# Patient Record
Sex: MALE | Race: WHITE | NOT HISPANIC OR LATINO | Employment: FULL TIME | URBAN - METROPOLITAN AREA
[De-identification: names, ages, dates, MRNs, and addresses within clinical notes are randomized per-mention and may not be internally consistent; named-entity substitution may affect disease eponyms.]

---

## 2023-07-17 ENCOUNTER — OFFICE VISIT (OUTPATIENT)
Dept: PRIMARY CARE | Facility: CLINIC | Age: 48
End: 2023-07-17
Payer: COMMERCIAL

## 2023-07-17 ENCOUNTER — APPOINTMENT (OUTPATIENT)
Dept: PRIMARY CARE | Facility: CLINIC | Age: 48
End: 2023-07-17
Payer: COMMERCIAL

## 2023-07-17 VITALS
DIASTOLIC BLOOD PRESSURE: 83 MMHG | OXYGEN SATURATION: 97 % | TEMPERATURE: 98.6 F | BODY MASS INDEX: 27.19 KG/M2 | SYSTOLIC BLOOD PRESSURE: 128 MMHG | HEIGHT: 70 IN | WEIGHT: 189.9 LBS | HEART RATE: 65 BPM

## 2023-07-17 DIAGNOSIS — R06.2 WHEEZING: Primary | ICD-10-CM

## 2023-07-17 DIAGNOSIS — M62.838 MUSCLE SPASMS OF NECK: ICD-10-CM

## 2023-07-17 DIAGNOSIS — R42 DIZZINESS: Primary | ICD-10-CM

## 2023-07-17 DIAGNOSIS — H55.09 HORIZONTAL NYSTAGMUS: ICD-10-CM

## 2023-07-17 PROBLEM — H66.90 OTITIS MEDIA, ACUTE: Status: ACTIVE | Noted: 2023-07-17

## 2023-07-17 PROBLEM — R51.9 HEADACHE: Status: ACTIVE | Noted: 2023-07-17

## 2023-07-17 PROBLEM — R07.89 CHEST PAIN, MIDSTERNAL: Status: ACTIVE | Noted: 2023-07-17

## 2023-07-17 PROBLEM — R20.2 PARESTHESIA OF UPPER EXTREMITY: Status: ACTIVE | Noted: 2023-07-17

## 2023-07-17 PROBLEM — F41.9 ANXIETY: Status: ACTIVE | Noted: 2023-07-17

## 2023-07-17 PROBLEM — S16.1XXA CERVICAL STRAIN: Status: ACTIVE | Noted: 2023-07-17

## 2023-07-17 PROBLEM — H01.005 BLEPHARITIS OF LEFT LOWER EYELID: Status: ACTIVE | Noted: 2023-07-17

## 2023-07-17 PROBLEM — R34 DECREASED URINE OUTPUT: Status: ACTIVE | Noted: 2023-07-17

## 2023-07-17 PROBLEM — M54.50 LOW BACK PAIN: Status: ACTIVE | Noted: 2023-07-17

## 2023-07-17 PROBLEM — E78.5 HLD (HYPERLIPIDEMIA): Status: ACTIVE | Noted: 2023-07-17

## 2023-07-17 PROBLEM — M54.6 PAIN IN THORACIC SPINE: Status: ACTIVE | Noted: 2023-07-17

## 2023-07-17 PROBLEM — D10.39: Status: ACTIVE | Noted: 2023-07-17

## 2023-07-17 PROBLEM — R19.7 DIARRHEA: Status: ACTIVE | Noted: 2023-07-17

## 2023-07-17 PROBLEM — Q82.8 KERATODERMA: Status: ACTIVE | Noted: 2023-07-17

## 2023-07-17 PROCEDURE — 99213 OFFICE O/P EST LOW 20 MIN: CPT | Performed by: PHYSICIAN ASSISTANT

## 2023-07-17 PROCEDURE — 4004F PT TOBACCO SCREEN RCVD TLK: CPT | Performed by: PHYSICIAN ASSISTANT

## 2023-07-17 RX ORDER — ASPIRIN 81 MG/1
81 TABLET ORAL DAILY
COMMUNITY

## 2023-07-17 RX ORDER — CYCLOBENZAPRINE HCL 10 MG
10 TABLET ORAL NIGHTLY PRN
Qty: 30 TABLET | Refills: 2 | Status: SHIPPED | OUTPATIENT
Start: 2023-07-17 | End: 2024-04-15 | Stop reason: SDUPTHER

## 2023-07-17 RX ORDER — ALBUTEROL SULFATE 90 UG/1
2 AEROSOL, METERED RESPIRATORY (INHALATION) EVERY 4 HOURS PRN
COMMUNITY
Start: 2021-12-06 | End: 2023-07-17 | Stop reason: SDUPTHER

## 2023-07-17 ASSESSMENT — PATIENT HEALTH QUESTIONNAIRE - PHQ9
SUM OF ALL RESPONSES TO PHQ9 QUESTIONS 1 AND 2: 0
2. FEELING DOWN, DEPRESSED OR HOPELESS: NOT AT ALL
1. LITTLE INTEREST OR PLEASURE IN DOING THINGS: NOT AT ALL

## 2023-07-17 NOTE — PROGRESS NOTES
Subjective   Patient ID: Tiburcio Peterson Jr. is a 48 y.o. male who presents for Dizziness.    HPI       History of Present Illness  He complains of dizziness. The dizziness has been present for 10 hour. He describes the symptoms as disequalibirum and lightheadedness. Symptoms are exacerbated by rapid head movements, looking to the right, to the left, and bending. He also complains of otalgia. He denies aural pressure and otalgia.  He has been treated with nothing with  out  improvement, no OTC's tried.     Complains of having dizzy spells when staying still with slight headache in the front   Back pain , neck tightness noted rach on the L.   Hotness on left side of head   knot on left shoulder   He feels like he has vestibular issues- was tx and been doing exercises that haven't helped.   He has muscle spasm type feeling in the left side of his neck, upper trapezius, chest and shoulder.  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 He has had no relief.   Pt is wondering if these muscle spasms are helping contribute to his dizziness?  He has been through a couple sessions of vestibular physical therapy without any resolve years ago.    MRI brain done 3/22 (he had similar sx back then)    Review of Systems  Constitutional: Patient denies any fever, chills, loss of appetite, or unexplained weight loss.  Cardiovascular: Patient denies any chest pain, shortness of breath with exertion, tachycardia, palpitations, orthopnea, or paroxysmal nocturnal dyspnea.  Respiratory: Patient denies any cough, shortness breath, or wheezing.  Gastrointestinal patient denies any nausea, vomiting, diarrhea, constipation, melena, hematochezia, or reflux symptoms  Skin: Denies any rashes or skin lesions   Neurology: Patient denies any new motor or sensory losses.  Denies any numbness, tingling, weakness, and incoordination of the extremities.  Patient also denies any tremor, seizures, or  "gait instability.  Endocrinology: Denies any polyuria, polydipsia, polyphagia, or heat/cold intolerance.    Objective   /83   Pulse 65   Temp 37 °C (98.6 °F)   Ht 1.778 m (5' 10\")   Wt 86.1 kg (189 lb 14.4 oz)   SpO2 97%   BMI 27.25 kg/m²     Physical Exam  Gen. appearance: Alert and cooperative, in no acute distress, well-developed, well-nourished male.  Bilateral EACs and TMs within normal limits  Neck: Supple and without adenopathy or rigidity.  Muscle spasm noted at the anterior sternocleidomastoid, down into the upper pectoralis muscles and at the upper anterior trapezius;  there is no JVD at 90° and no carotid bruits are noted.  There is no thyromegaly, thyroid tenderness, or palpable thyroid nodules.  Heart: Regular rate and rhythm without murmur or ectopy.  Lungs: Lungs are clear to auscultation bilaterally with good air exchange.  Skin: Good turgor, moist, warm and without rashes or lesions.  Neurology:  Alert and oriented ×3, no tremor, normal gait.  Cranial nerves II through XII are grossly intact.  Deep tendon reflexes are +2/4 and equal bilaterally for the upper and lower extremities.  Strength is +5/5 equal bilaterally upper and lower extremities.  Romberg testing is negative; positive horizontal nystagmus to the left and when returning to midline  Extremities: No clubbing, cyanosis, or edema    Assessment/Plan   Diagnoses and all orders for this visit:  Dizziness  -     Comprehensive Metabolic Panel; Future  -     Comprehensive Metabolic Panel; Future  -     Hemoglobin A1C; Future  -     Vitamin D 1,25 Dihydroxy; Future  -     Referral to Physical Therapy; Future  Patient will also maintain hydration and we will consider referral to Neuro.     Horizontal nystagmus  -     Referral to Physical Therapy; Future  Patient will start vestibular therapy again.    Muscle spasms of neck  -     cyclobenzaprine (Flexeril) 10 mg tablet; Take 1 tablet (10 mg) by mouth as needed at bedtime for muscle " spasms.  For the muscle spasms he will try heat, stretching, Flexeril and naproxen at night for approximately the next 2 weeks and return to the clinic if this is not improving.    Patient understands that should they have testing outside   facilities that we may not receive the results and was told to call us if they have not heard from our office within a week after testing.    [facility] uses voice recognition technology for dictations. Sometimes the software misinterprets words. Please take this into account when reading this.

## 2023-07-19 RX ORDER — ALBUTEROL SULFATE 90 UG/1
2 AEROSOL, METERED RESPIRATORY (INHALATION) EVERY 4 HOURS PRN
Qty: 18 G | Refills: 3 | Status: SHIPPED | OUTPATIENT
Start: 2023-07-19

## 2023-07-21 ENCOUNTER — LAB (OUTPATIENT)
Dept: LAB | Facility: LAB | Age: 48
End: 2023-07-21
Payer: COMMERCIAL

## 2023-07-21 DIAGNOSIS — R42 DIZZINESS: ICD-10-CM

## 2023-07-21 LAB
ALANINE AMINOTRANSFERASE (SGPT) (U/L) IN SER/PLAS: 20 U/L (ref 10–52)
ALBUMIN (G/DL) IN SER/PLAS: 4.2 G/DL (ref 3.4–5)
ALKALINE PHOSPHATASE (U/L) IN SER/PLAS: 66 U/L (ref 33–120)
ANION GAP IN SER/PLAS: 9 MMOL/L (ref 10–20)
ASPARTATE AMINOTRANSFERASE (SGOT) (U/L) IN SER/PLAS: 18 U/L (ref 9–39)
BILIRUBIN TOTAL (MG/DL) IN SER/PLAS: 0.6 MG/DL (ref 0–1.2)
CALCIUM (MG/DL) IN SER/PLAS: 9.5 MG/DL (ref 8.6–10.3)
CARBON DIOXIDE, TOTAL (MMOL/L) IN SER/PLAS: 29 MMOL/L (ref 21–32)
CHLORIDE (MMOL/L) IN SER/PLAS: 106 MMOL/L (ref 98–107)
CREATININE (MG/DL) IN SER/PLAS: 0.9 MG/DL (ref 0.5–1.3)
ESTIMATED AVERAGE GLUCOSE FOR HBA1C: 103 MG/DL
GFR MALE: >90 ML/MIN/1.73M2
GLUCOSE (MG/DL) IN SER/PLAS: 80 MG/DL (ref 74–99)
HEMOGLOBIN A1C/HEMOGLOBIN TOTAL IN BLOOD: 5.2 %
POTASSIUM (MMOL/L) IN SER/PLAS: 4.1 MMOL/L (ref 3.5–5.3)
PROTEIN TOTAL: 6.9 G/DL (ref 6.4–8.2)
SODIUM (MMOL/L) IN SER/PLAS: 140 MMOL/L (ref 136–145)
UREA NITROGEN (MG/DL) IN SER/PLAS: 14 MG/DL (ref 6–23)

## 2023-07-21 PROCEDURE — 83036 HEMOGLOBIN GLYCOSYLATED A1C: CPT

## 2023-07-21 PROCEDURE — 82652 VIT D 1 25-DIHYDROXY: CPT

## 2023-07-21 PROCEDURE — 80053 COMPREHEN METABOLIC PANEL: CPT

## 2023-07-21 PROCEDURE — 36415 COLL VENOUS BLD VENIPUNCTURE: CPT

## 2023-07-24 LAB — VITAMIN D 1,25-DIHYDROXY: 29 PG/ML (ref 19.9–79.3)

## 2023-07-31 ENCOUNTER — TELEPHONE (OUTPATIENT)
Dept: PRIMARY CARE | Facility: CLINIC | Age: 48
End: 2023-07-31
Payer: COMMERCIAL

## 2023-07-31 NOTE — TELEPHONE ENCOUNTER
----- Message from Daria Kulkarni PA-C sent at 7/31/2023 12:25 PM EDT -----  Please call the patient regarding his labs, they look good.

## 2024-04-15 ENCOUNTER — OFFICE VISIT (OUTPATIENT)
Dept: PRIMARY CARE | Facility: CLINIC | Age: 49
End: 2024-04-15
Payer: COMMERCIAL

## 2024-04-15 VITALS
SYSTOLIC BLOOD PRESSURE: 119 MMHG | HEIGHT: 70 IN | DIASTOLIC BLOOD PRESSURE: 84 MMHG | BODY MASS INDEX: 26.87 KG/M2 | WEIGHT: 187.7 LBS | OXYGEN SATURATION: 98 % | TEMPERATURE: 97.9 F | HEART RATE: 67 BPM

## 2024-04-15 DIAGNOSIS — M62.838 MUSCLE SPASMS OF NECK: ICD-10-CM

## 2024-04-15 DIAGNOSIS — S39.012D STRAIN OF LUMBAR REGION, SUBSEQUENT ENCOUNTER: ICD-10-CM

## 2024-04-15 DIAGNOSIS — K42.9 UMBILICAL HERNIA WITHOUT OBSTRUCTION AND WITHOUT GANGRENE: Primary | ICD-10-CM

## 2024-04-15 PROCEDURE — 99213 OFFICE O/P EST LOW 20 MIN: CPT | Performed by: PHYSICIAN ASSISTANT

## 2024-04-15 RX ORDER — CYCLOBENZAPRINE HCL 10 MG
10 TABLET ORAL NIGHTLY PRN
Qty: 30 TABLET | Refills: 2 | Status: SHIPPED | OUTPATIENT
Start: 2024-04-15 | End: 2024-12-11

## 2024-04-15 RX ORDER — NAPROXEN 500 MG/1
500 TABLET ORAL 2 TIMES DAILY PRN
Qty: 60 TABLET | Refills: 2 | Status: SHIPPED | OUTPATIENT
Start: 2024-04-15 | End: 2024-07-14

## 2024-04-15 ASSESSMENT — PATIENT HEALTH QUESTIONNAIRE - PHQ9
2. FEELING DOWN, DEPRESSED OR HOPELESS: NOT AT ALL
1. LITTLE INTEREST OR PLEASURE IN DOING THINGS: NOT AT ALL
SUM OF ALL RESPONSES TO PHQ9 QUESTIONS 1 AND 2: 0

## 2024-04-15 NOTE — PROGRESS NOTES
"Subjective   Patient ID: Tiburcio Peterson Jr. is a 49 y.o. male who presents for Hernia.    HPI         Patient states he feels like he might of \"rupture his umbilical hernia\",  says painful as well as back pain.   Pain shoots through groin, feels hardness, stopped eating heavy and it helps. Noticed it a lot on Friday.   Pain is a 4/10 groin, pushing in umbilicus makes pain shoot to groin.   No issues with urinating /BM's are a little strained and he feels like he needs to push on umbilicus to support his ability to have BM's.    Back 7/10 chronic issue with recent worsening, seen by Dr. Lennon in the past; lumbar area not recently imaged.    Pain does occ radiate down legs. L side worse than R.  NO saddle paresthesias.   Naproxen and Flexeril to help.  He has done physical therapy for his thoracic and cervical spine and improved significantly but does have bouts of acute worsening.  Patient thinks that his hernia and core weakness could be contributing to lumbar pain        Review of Systems  Constitutional: Patient denies any fever, chills, loss of appetite, or unexplained weight loss.  Cardiovascular: Patient denies any chest pain, shortness of breath with exertion, tachycardia, palpitations, orthopnea, or paroxysmal nocturnal dyspnea.  Respiratory: Patient denies any cough, shortness breath, or wheezing.  Gastrointestinal patient denies any nausea, vomiting, diarrhea, constipation, melena, hematochezia, or reflux symptoms  Skin: Denies any rashes or skin lesions   Neurology: Patient denies any new motor or sensory losses.  Denies any numbness, tingling, weakness, and incoordination of the extremities.  Patient also denies any tremor, seizures, or gait instability.  Endocrinology: Denies any polyuria, polydipsia, polyphagia, or heat/cold intolerance.    Objective   /84   Pulse 67   Temp 36.6 °C (97.9 °F)   Ht 1.778 m (5' 10\")   Wt 85.1 kg (187 lb 11.2 oz)   SpO2 98%   BMI 26.93 kg/m²     Physical " Exam  Gen. appearance: Alert and cooperative, in no acute distress, well-developed, well-nourished male.  Neck: Supple and without adenopathy or rigidity.  There is no JVD at 90° and no carotid bruits are noted.  There is no thyromegaly, thyroid tenderness, or palpable thyroid nodules.  Heart: Regular rate and rhythm without murmur or ectopy.  Lungs: Lungs are clear to auscultation bilaterally with good air exchange.  Billable hernia with mild tenderness noted at the 12:00 and 9 o'clock position  Skin: Good turgor, moist, warm and without rashes or lesions.  Neurological exam: Alert and oriented ×3, no tremor, normal gait.  Extremities: No clubbing, cyanosis, or edema  Back: SLR+ R side at 40 degrees. - Patricks b/l, LROM with flexion and extension of lumbar spine. No vertebral tenderness, No CVA tenderness.  Lumbar paraspinal muscle spasm noted bilaterally.  Worse on the right.    Assessment/Plan   Diagnoses and all orders for this visit:  Umbilical hernia without obstruction and without gangrene-nonstrangulated but tender.  Radiates to the groin.  Patient is being referred back to Dr. Ramirez who would he had previously established with for colonoscopy for full evaluation of his hernia.      Strain of lumbar region, subsequent encounter  -     XR lumbar spine 2-3 views; Future  Chronic issue with recent worsening.  No recent x-rays.  Those were ordered and we will call him with those results as soon as they are reviewed.  He will continue with Flexeril and naproxen to help with these issues.  Core strengthening was also discussed at length.  He is currently not interested in physical therapy at this time because of cost.    Muscle spasms of neck  -     cyclobenzaprine (Flexeril) 10 mg tablet; Take 1 tablet (10 mg) by mouth as needed at bedtime for muscle spasms.  -     naproxen (Naprosyn) 500 mg tablet; Take 1 tablet (500 mg) by mouth 2 times a day as needed for mild pain (1 - 3) (pain).  Patient will also continue  to take Flexeril and naproxen as needed for cervical m. Spasms.     Patient is to return to clinic if symptoms or not improved within the next 2 weeks or sooner should they worsen.  At any point in time he have saddle paresthesia, lower extremity numbness tingling or weakness he is to go to the emergency room.    Patient understands that should they have testing outside   facilities that we may not receive the results and was told to call us if they have not heard from our office within a week after testing.    [facility] uses voice recognition technology for dictations. Sometimes the software misinterprets words. Please take this into account when reading this.

## 2024-04-25 ENCOUNTER — HOSPITAL ENCOUNTER (OUTPATIENT)
Dept: RADIOLOGY | Facility: HOSPITAL | Age: 49
Discharge: HOME | End: 2024-04-25
Payer: COMMERCIAL

## 2024-04-25 DIAGNOSIS — S39.012D STRAIN OF LUMBAR REGION, SUBSEQUENT ENCOUNTER: ICD-10-CM

## 2024-04-25 PROCEDURE — 72110 X-RAY EXAM L-2 SPINE 4/>VWS: CPT | Performed by: RADIOLOGY

## 2024-04-25 PROCEDURE — 72110 X-RAY EXAM L-2 SPINE 4/>VWS: CPT

## 2024-05-01 ENCOUNTER — OFFICE VISIT (OUTPATIENT)
Dept: SURGERY | Facility: CLINIC | Age: 49
End: 2024-05-01
Payer: COMMERCIAL

## 2024-05-01 VITALS
SYSTOLIC BLOOD PRESSURE: 128 MMHG | HEIGHT: 71 IN | HEART RATE: 82 BPM | WEIGHT: 189 LBS | BODY MASS INDEX: 26.46 KG/M2 | DIASTOLIC BLOOD PRESSURE: 84 MMHG

## 2024-05-01 DIAGNOSIS — K42.9 UMBILICAL HERNIA WITHOUT OBSTRUCTION AND WITHOUT GANGRENE: Primary | ICD-10-CM

## 2024-05-01 PROCEDURE — 99213 OFFICE O/P EST LOW 20 MIN: CPT | Performed by: SURGERY

## 2024-05-01 ASSESSMENT — ENCOUNTER SYMPTOMS
NEUROLOGICAL NEGATIVE: 1
RESPIRATORY NEGATIVE: 1
ENDOCRINE NEGATIVE: 1
EYES NEGATIVE: 1
PSYCHIATRIC NEGATIVE: 1
ALLERGIC/IMMUNOLOGIC NEGATIVE: 1
CONSTITUTIONAL NEGATIVE: 1
GASTROINTESTINAL NEGATIVE: 1
CARDIOVASCULAR NEGATIVE: 1
MUSCULOSKELETAL NEGATIVE: 1
HEMATOLOGIC/LYMPHATIC NEGATIVE: 1

## 2024-05-01 NOTE — PROGRESS NOTES
Subjective   Patient ID: Tiburcio Peterson Jr. is a 49 y.o. male who presents for Hernia (Here today to talk about a hernia/).  HPI  Increasingly symptomatic umbilical hernia.  Gradually noticed more protrusion associated with discomfort to direct palpation.  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      Review of Systems   Constitutional: Negative.    HENT: Negative.     Eyes: Negative.    Respiratory: Negative.     Cardiovascular: Negative.    Gastrointestinal: Negative.    Endocrine: Negative.    Genitourinary: Negative.    Musculoskeletal: Negative.    Skin: Negative.    Allergic/Immunologic: Negative.    Neurological: Negative.    Hematological: Negative.    Psychiatric/Behavioral: Negative.           No past medical history on file.  Past Surgical History:   Procedure Laterality Date    OTHER SURGICAL HISTORY  12/18/2019    Femur fracture repair    OTHER SURGICAL HISTORY  12/18/2019    Radius fracture repair     No family history on file.   Social History     Socioeconomic History    Marital status:      Spouse name: None    Number of children: None    Years of education: None    Highest education level: None   Occupational History    None   Tobacco Use    Smoking status: Every Day     Current packs/day: 0.50     Average packs/day: 0.5 packs/day for 20.0 years (10.0 ttl pk-yrs)     Types: Cigarettes    Smokeless tobacco: Never    Tobacco comments:     Patient understands that continued smoking will increase the risks of lung disease, vascular disease, and multiple malignancies.   Substance and Sexual Activity    Alcohol use: Yes     Comment: socially    Drug use: Never    Sexual activity: None   Other Topics Concern    None   Social History Narrative    None     Social Determinants of Health     Financial Resource Strain: Not on file   Food Insecurity: Not on file   Transportation Needs: Not on file   Physical  Activity: Not on file   Stress: Not on file   Social Connections: Not on file   Intimate Partner Violence: Not on file   Housing Stability: Not on file          Current Outpatient Medications:     albuterol 90 mcg/actuation inhaler, Inhale 2 puffs every 4 hours if needed for wheezing or shortness of breath., Disp: 18 g, Rfl: 3    aspirin 81 mg EC tablet, Take 1 tablet (81 mg) by mouth once daily., Disp: , Rfl:     cyclobenzaprine (Flexeril) 10 mg tablet, Take 1 tablet (10 mg) by mouth as needed at bedtime for muscle spasms., Disp: 30 tablet, Rfl: 2    naproxen (Naprosyn) 500 mg tablet, Take 1 tablet (500 mg) by mouth 2 times a day as needed for mild pain (1 - 3) (pain)., Disp: 60 tablet, Rfl: 2     Objective   Vitals:    05/01/24 1456   BP: 128/84   Pulse: 82      Physical Exam  Constitutional:       General: He is not in acute distress.     Appearance: Normal appearance.   HENT:      Head: Normocephalic and atraumatic.      Mouth/Throat:      Pharynx: Oropharynx is clear.   Eyes:      Conjunctiva/sclera: Conjunctivae normal.      Pupils: Pupils are equal, round, and reactive to light.   Cardiovascular:      Rate and Rhythm: Normal rate and regular rhythm.   Pulmonary:      Effort: Pulmonary effort is normal.   Abdominal:      General: Abdomen is flat. There is no distension.      Palpations: Abdomen is soft.      Hernia: A hernia is present.      Comments: Umbilical hernia less than 1 cm defect, reducible, tender to palpation.   Musculoskeletal:         General: Normal range of motion.   Skin:     General: Skin is warm and dry.   Neurological:      General: No focal deficit present.      Mental Status: He is alert. Mental status is at baseline.   Psychiatric:         Mood and Affect: Mood normal.         Behavior: Behavior normal.         Thought Content: Thought content normal.         Judgment: Judgment normal.           Assessment/Plan   Problem List Items Addressed This Visit    None  Visit Diagnoses          Codes    Umbilical hernia without obstruction and without gangrene    -  Primary K42.9            Scheduled for umbilical hernia repair.  Indications for procedure discussed.  Use of mesh discussed.  Risks of bleeding, infection, injury to surrounding structures, anesthesia complications cardiac/respiratory/robotic discussed.  Wishes to review his work schedule prior to proceeding and will call when ready to schedule.  Sasha Ramirez MD

## 2024-05-15 DIAGNOSIS — R16.0 LIVER MASSES: Primary | ICD-10-CM

## 2024-05-15 DIAGNOSIS — M54.41 CHRONIC BILATERAL LOW BACK PAIN WITH RIGHT-SIDED SCIATICA: ICD-10-CM

## 2024-05-15 DIAGNOSIS — G89.29 CHRONIC BILATERAL LOW BACK PAIN WITH RIGHT-SIDED SCIATICA: ICD-10-CM

## 2024-05-22 ENCOUNTER — TELEPHONE (OUTPATIENT)
Dept: PRIMARY CARE | Facility: CLINIC | Age: 49
End: 2024-05-22
Payer: COMMERCIAL

## 2024-10-07 ENCOUNTER — APPOINTMENT (OUTPATIENT)
Dept: CARDIOLOGY | Facility: HOSPITAL | Age: 49
End: 2024-10-07
Payer: COMMERCIAL

## 2024-10-07 ENCOUNTER — APPOINTMENT (OUTPATIENT)
Dept: RADIOLOGY | Facility: HOSPITAL | Age: 49
End: 2024-10-07
Payer: COMMERCIAL

## 2024-10-07 ENCOUNTER — TELEPHONE (OUTPATIENT)
Dept: PRIMARY CARE | Facility: CLINIC | Age: 49
End: 2024-10-07
Payer: COMMERCIAL

## 2024-10-07 ENCOUNTER — HOSPITAL ENCOUNTER (EMERGENCY)
Facility: HOSPITAL | Age: 49
Discharge: HOME | End: 2024-10-07
Attending: EMERGENCY MEDICINE
Payer: COMMERCIAL

## 2024-10-07 VITALS
RESPIRATION RATE: 18 BRPM | BODY MASS INDEX: 25.05 KG/M2 | HEART RATE: 58 BPM | TEMPERATURE: 97.2 F | WEIGHT: 175 LBS | SYSTOLIC BLOOD PRESSURE: 116 MMHG | OXYGEN SATURATION: 98 % | HEIGHT: 70 IN | DIASTOLIC BLOOD PRESSURE: 71 MMHG

## 2024-10-07 DIAGNOSIS — R07.9 CHEST PAIN, UNSPECIFIED TYPE: Primary | ICD-10-CM

## 2024-10-07 LAB
ALBUMIN SERPL BCP-MCNC: 4.9 G/DL (ref 3.4–5)
ALP SERPL-CCNC: 83 U/L (ref 33–120)
ALT SERPL W P-5'-P-CCNC: 20 U/L (ref 10–52)
ANION GAP SERPL CALC-SCNC: 10 MMOL/L (ref 10–20)
AST SERPL W P-5'-P-CCNC: 18 U/L (ref 9–39)
ATRIAL RATE: 62 BPM
ATRIAL RATE: 63 BPM
BASOPHILS # BLD AUTO: 0.08 X10*3/UL (ref 0–0.1)
BASOPHILS NFR BLD AUTO: 1.2 %
BILIRUB SERPL-MCNC: 0.9 MG/DL (ref 0–1.2)
BNP SERPL-MCNC: 30 PG/ML (ref 0–99)
BUN SERPL-MCNC: 11 MG/DL (ref 6–23)
CALCIUM SERPL-MCNC: 10.1 MG/DL (ref 8.6–10.3)
CARDIAC TROPONIN I PNL SERPL HS: <3 NG/L (ref 0–20)
CARDIAC TROPONIN I PNL SERPL HS: <3 NG/L (ref 0–20)
CHLORIDE SERPL-SCNC: 106 MMOL/L (ref 98–107)
CO2 SERPL-SCNC: 28 MMOL/L (ref 21–32)
CREAT SERPL-MCNC: 0.9 MG/DL (ref 0.5–1.3)
D DIMER PPP FEU-MCNC: <215 NG/ML FEU
EGFRCR SERPLBLD CKD-EPI 2021: >90 ML/MIN/1.73M*2
EOSINOPHIL # BLD AUTO: 0.43 X10*3/UL (ref 0–0.7)
EOSINOPHIL NFR BLD AUTO: 6.5 %
ERYTHROCYTE [DISTWIDTH] IN BLOOD BY AUTOMATED COUNT: 12.7 % (ref 11.5–14.5)
GLUCOSE SERPL-MCNC: 86 MG/DL (ref 74–99)
HCT VFR BLD AUTO: 47.7 % (ref 41–52)
HGB BLD-MCNC: 16.2 G/DL (ref 13.5–17.5)
IMM GRANULOCYTES # BLD AUTO: 0.02 X10*3/UL (ref 0–0.7)
IMM GRANULOCYTES NFR BLD AUTO: 0.3 % (ref 0–0.9)
LYMPHOCYTES # BLD AUTO: 1.6 X10*3/UL (ref 1.2–4.8)
LYMPHOCYTES NFR BLD AUTO: 24.2 %
MAGNESIUM SERPL-MCNC: 2.03 MG/DL (ref 1.6–2.4)
MCH RBC QN AUTO: 30.6 PG (ref 26–34)
MCHC RBC AUTO-ENTMCNC: 34 G/DL (ref 32–36)
MCV RBC AUTO: 90 FL (ref 80–100)
MONOCYTES # BLD AUTO: 0.42 X10*3/UL (ref 0.1–1)
MONOCYTES NFR BLD AUTO: 6.4 %
NEUTROPHILS # BLD AUTO: 4.05 X10*3/UL (ref 1.2–7.7)
NEUTROPHILS NFR BLD AUTO: 61.4 %
NRBC BLD-RTO: 0 /100 WBCS (ref 0–0)
P AXIS: 26 DEGREES
P AXIS: 46 DEGREES
P OFFSET: 192 MS
P OFFSET: 194 MS
P ONSET: 146 MS
P ONSET: 148 MS
PLATELET # BLD AUTO: 211 X10*3/UL (ref 150–450)
POTASSIUM SERPL-SCNC: 3.9 MMOL/L (ref 3.5–5.3)
PR INTERVAL: 142 MS
PR INTERVAL: 146 MS
PROT SERPL-MCNC: 8 G/DL (ref 6.4–8.2)
Q ONSET: 219 MS
Q ONSET: 219 MS
QRS COUNT: 10 BEATS
QRS COUNT: 10 BEATS
QRS DURATION: 84 MS
QRS DURATION: 86 MS
QT INTERVAL: 400 MS
QT INTERVAL: 434 MS
QTC CALCULATION(BAZETT): 409 MS
QTC CALCULATION(BAZETT): 440 MS
QTC FREDERICIA: 406 MS
QTC FREDERICIA: 439 MS
R AXIS: 36 DEGREES
R AXIS: 54 DEGREES
RBC # BLD AUTO: 5.29 X10*6/UL (ref 4.5–5.9)
SODIUM SERPL-SCNC: 140 MMOL/L (ref 136–145)
T AXIS: 43 DEGREES
T AXIS: 46 DEGREES
T OFFSET: 419 MS
T OFFSET: 436 MS
VENTRICULAR RATE: 62 BPM
VENTRICULAR RATE: 63 BPM
WBC # BLD AUTO: 6.6 X10*3/UL (ref 4.4–11.3)

## 2024-10-07 PROCEDURE — 71045 X-RAY EXAM CHEST 1 VIEW: CPT | Performed by: RADIOLOGY

## 2024-10-07 PROCEDURE — 83735 ASSAY OF MAGNESIUM: CPT | Performed by: EMERGENCY MEDICINE

## 2024-10-07 PROCEDURE — 36415 COLL VENOUS BLD VENIPUNCTURE: CPT | Performed by: EMERGENCY MEDICINE

## 2024-10-07 PROCEDURE — 85379 FIBRIN DEGRADATION QUANT: CPT | Performed by: EMERGENCY MEDICINE

## 2024-10-07 PROCEDURE — 93005 ELECTROCARDIOGRAM TRACING: CPT

## 2024-10-07 PROCEDURE — 80053 COMPREHEN METABOLIC PANEL: CPT | Performed by: EMERGENCY MEDICINE

## 2024-10-07 PROCEDURE — 84484 ASSAY OF TROPONIN QUANT: CPT | Performed by: EMERGENCY MEDICINE

## 2024-10-07 PROCEDURE — 71045 X-RAY EXAM CHEST 1 VIEW: CPT

## 2024-10-07 PROCEDURE — 83880 ASSAY OF NATRIURETIC PEPTIDE: CPT | Performed by: EMERGENCY MEDICINE

## 2024-10-07 PROCEDURE — 85025 COMPLETE CBC W/AUTO DIFF WBC: CPT | Performed by: EMERGENCY MEDICINE

## 2024-10-07 PROCEDURE — 99283 EMERGENCY DEPT VISIT LOW MDM: CPT

## 2024-10-07 ASSESSMENT — LIFESTYLE VARIABLES
EVER HAD A DRINK FIRST THING IN THE MORNING TO STEADY YOUR NERVES TO GET RID OF A HANGOVER: NO
TOTAL SCORE: 0
EVER FELT BAD OR GUILTY ABOUT YOUR DRINKING: NO
HAVE PEOPLE ANNOYED YOU BY CRITICIZING YOUR DRINKING: NO
HAVE YOU EVER FELT YOU SHOULD CUT DOWN ON YOUR DRINKING: NO

## 2024-10-07 ASSESSMENT — HEART SCORE
TROPONIN: LESS THAN OR EQUAL TO NORMAL LIMIT
RISK FACTORS: 1-2 RISK FACTORS
HISTORY: SLIGHTLY SUSPICIOUS
AGE: 45-64
HEART SCORE: 2
ECG: NORMAL

## 2024-10-07 ASSESSMENT — COLUMBIA-SUICIDE SEVERITY RATING SCALE - C-SSRS
2. HAVE YOU ACTUALLY HAD ANY THOUGHTS OF KILLING YOURSELF?: NO
6. HAVE YOU EVER DONE ANYTHING, STARTED TO DO ANYTHING, OR PREPARED TO DO ANYTHING TO END YOUR LIFE?: NO
1. IN THE PAST MONTH, HAVE YOU WISHED YOU WERE DEAD OR WISHED YOU COULD GO TO SLEEP AND NOT WAKE UP?: NO

## 2024-10-07 ASSESSMENT — PAIN DESCRIPTION - DESCRIPTORS: DESCRIPTORS: RADIATING

## 2024-10-07 ASSESSMENT — PAIN SCALES - GENERAL: PAINLEVEL_OUTOF10: 7

## 2024-10-07 ASSESSMENT — PAIN - FUNCTIONAL ASSESSMENT: PAIN_FUNCTIONAL_ASSESSMENT: 0-10

## 2024-10-07 NOTE — TELEPHONE ENCOUNTER
Pt is calling requesting apt for chest tightness radiating to neck and Lt upper back. Pt also reports sweats, tingling sensation on Lt cheek,numbness of Lt hand, SOB, fatigue. Pt says he has a bit of blurred vision but says he wears contacts so says its nothing out of the normal. Patient states he started taking aspirin yesterday when sx started. Pt also says he has been stressed and overwhelmed wit stuff. Pt denies any other sx. Please advise

## 2024-10-07 NOTE — ED PROVIDER NOTES
"HPI   Chief Complaint   Patient presents with    Chest Pain     \"Here for left chest pain with left arm numbness in left arm and into the neck.'         History provided by:  Patient  History of Present Illness:  49-year-old male presents with couple day history of left-sided chest pain with left arm, neck and face tingling.  He states that he has had issues like this with multiple workups which have resulted in no diagnosis.  No fever, chills or cough.  No back or flank pain.  No leg swelling.  Symptoms are worse with movement.  No trauma or travel.  No sick contacts.      PMFSH:   As per HPI, otherwise nurses notes reviewed in EMR.    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    HLD (hyperlipidemia) 07/17/2023    Headache 07/17/2023    Open wound of elbow 05/02/2005    Open wound of hip and thigh, complicated 05/02/2005    Otitis media, acute 07/17/2023    Pain in thoracic spine 07/17/2023    Paresthesia of upper extremity 07/17/2023     Resolved Ambulatory Problems     Diagnosis Date Noted    No Resolved Ambulatory Problems     No Additional Past Medical History      Past Surgical History:   Past Surgical History:   Procedure Laterality Date    OTHER SURGICAL HISTORY  12/18/2019    Femur fracture repair    OTHER SURGICAL HISTORY  12/18/2019    Radius fracture repair      Family History: No family history on file.   Social History:    Social History "     Socioeconomic History    Marital status:      Spouse name: Not on file    Number of children: Not on file    Years of education: Not on file    Highest education level: Not on file   Occupational History    Not on file   Tobacco Use    Smoking status: Every Day     Current packs/day: 0.50     Average packs/day: 0.5 packs/day for 20.0 years (10.0 ttl pk-yrs)     Types: Cigarettes    Smokeless tobacco: Never    Tobacco comments:     Patient understands that continued smoking will increase the risks of lung disease, vascular disease, and multiple malignancies.   Vaping Use    Vaping status: Never Used   Substance and Sexual Activity    Alcohol use: Yes     Comment: socially    Drug use: Never    Sexual activity: Not on file   Other Topics Concern    Not on file   Social History Narrative    Not on file     Social Determinants of Health     Financial Resource Strain: Not on file   Food Insecurity: Not on file   Transportation Needs: Not on file   Physical Activity: Not on file   Stress: Not on file   Social Connections: Not on file   Intimate Partner Violence: Not on file   Housing Stability: Not on file              Patient History   No past medical history on file.  Past Surgical History:   Procedure Laterality Date    OTHER SURGICAL HISTORY  12/18/2019    Femur fracture repair    OTHER SURGICAL HISTORY  12/18/2019    Radius fracture repair     No family history on file.  Social History     Tobacco Use    Smoking status: Every Day     Current packs/day: 0.50     Average packs/day: 0.5 packs/day for 20.0 years (10.0 ttl pk-yrs)     Types: Cigarettes    Smokeless tobacco: Never    Tobacco comments:     Patient understands that continued smoking will increase the risks of lung disease, vascular disease, and multiple malignancies.   Vaping Use    Vaping status: Never Used   Substance Use Topics    Alcohol use: Yes     Comment: socially    Drug use: Never       Physical Exam   ED Triage Vitals [10/07/24 1452]    Temperature Heart Rate Respirations BP   36.2 °C (97.2 °F) 65 18 128/89      Pulse Ox Temp Source Heart Rate Source Patient Position   100 % Temporal Monitor Sitting      BP Location FiO2 (%)     Right arm --       Physical Exam  Physical Exam:    ED Triage Vitals [10/07/24 1452]   Temperature Heart Rate Respirations BP   36.2 °C (97.2 °F) 65 18 128/89      Pulse Ox Temp Source Heart Rate Source Patient Position   100 % Temporal Monitor Sitting      BP Location FiO2 (%)     Right arm --         Constitutional: Vital signs per nursing notes.  Well developed, well nourished.  No acute distress.    Psychiatric: alert and oriented to person, place, and time; no abnormalities of mood or affect; memory intact  Eyes: PERRL; conjunctivae and lids normal; EOMI  ENT: otoscopic exam of external canal and TM´s normal; nasal mucosa, turbinates, and septum normal; mouth, tongue, and pharynx normal; pharynx without edema, exudate, or injection  Respiratory: normal respiratory effort and excursion; no rales, rhonchi, or wheezes; equal air entry  Cardiovascular: regular rate and rhythm; no murmurs, rubs or gallops; symmetric pulses; no edema; normal capillary refill; distal pulses present  Neurological: normal speech; CN II-XII grossly intact; normal motor and sensory function; no nystagmus  GI: no masses, tenderness, rebound or guarding; no palpable, pulsatile mass; no organomegaly; no hernia; normal bowel sounds; (-) Troncoso´s sign; (-) McBurney´s sign; (-) CVA tenderness  Lymphatic: no adenopathy of neck  Musculoskeletal: normal gait and station; normal digits and nails; no gross tendon or ligament injury; normal to palpation; normal strength/tone; neurovascular status intact; (-) Kiana´s sign; (-) straight leg raise  Skin: normal to inspection; normal to palpation; no rash  GCS: 15      ED Course & MDM   Diagnoses as of 10/07/24 1724   Chest pain, unspecified type                 No data recorded     [place] Coma Scale Score:  15 (10/07/24 1524 : Shu Campa RN) HEART Score: 2 (10/07/24 1550 : Artie FUENTES MD)                         Medical Decision Making  Medical Decision Making:    Differential Diagnoses Considered: ACS, arrhythmia, PE, pleurisy     EKG: My interpretation of EKG is normal sinus rhythm at 63 bpm with nonspecific ST-T changes             My interpretation of second EKG is normal sinus rhythm at 62 bpm with nonspecific ST-T changes.  There is no significant change from the initial EKG.    Labs Reviewed   COMPREHENSIVE METABOLIC PANEL - Normal       Result Value    Glucose 86      Sodium 140      Potassium 3.9      Chloride 106      Bicarbonate 28      Anion Gap 10      Urea Nitrogen 11      Creatinine 0.90      eGFR >90      Calcium 10.1      Albumin 4.9      Alkaline Phosphatase 83      Total Protein 8.0      AST 18      Bilirubin, Total 0.9      ALT 20     MAGNESIUM - Normal    Magnesium 2.03     B-TYPE NATRIURETIC PEPTIDE - Normal    BNP 30      Narrative:        <100 pg/mL - Heart failure unlikely  100-299 pg/mL - Intermediate probability of acute heart                  failure exacerbation. Correlate with clinical                  context and patient history.    >=300 pg/mL - Heart Failure likely. Correlate with clinical                  context and patient history.    BNP testing is performed using different testing methodology at [facility] than at other [facility]. Direct result comparisons should only be made within the same method.      SERIAL TROPONIN-INITIAL - Normal    Troponin I, High Sensitivity <3      Narrative:     Less than 99th percentile of normal range cutoff-  Female and children under 18 years old <14 ng/L; Male <21 ng/L: Negative  Repeat testing should be performed if clinically indicated.     Female and children under 18 years old 14-50 ng/L; Male 21-50 ng/L:  Consistent with possible cardiac damage and possible increased clinical   risk. Serial measurements may help  to assess extent of myocardial damage.     >50 ng/L: Consistent with cardiac damage, increased clinical risk and  myocardial infarction. Serial measurements may help assess extent of   myocardial damage.      NOTE: Children less than 1 year old may have higher baseline troponin   levels and results should be interpreted in conjunction with the overall   clinical context.     NOTE: Troponin I testing is performed using a different   testing methodology at [facility] than at other   [facility]. Direct result comparisons should only   be made within the same method.   SERIAL TROPONIN, 1 HOUR - Normal    Troponin I, High Sensitivity <3      Narrative:     Less than 99th percentile of normal range cutoff-  Female and children under 18 years old <14 ng/L; Male <21 ng/L: Negative  Repeat testing should be performed if clinically indicated.     Female and children under 18 years old 14-50 ng/L; Male 21-50 ng/L:  Consistent with possible cardiac damage and possible increased clinical   risk. Serial measurements may help to assess extent of myocardial damage.     >50 ng/L: Consistent with cardiac damage, increased clinical risk and  myocardial infarction. Serial measurements may help assess extent of   myocardial damage.      NOTE: Children less than 1 year old may have higher baseline troponin   levels and results should be interpreted in conjunction with the overall   clinical context.     NOTE: Troponin I testing is performed using a different   testing methodology at [facility] than at other   [facility]. Direct result comparisons should only   be made within the same method.   D-DIMER, VTE EXCLUSION - Normal    D-Dimer, Quantitative VTE Exclusion <215      Narrative:     The VTE Exclusion D-Dimer assay is reported in ng/mL Fibrinogen Equivalent Units (FEU).    Per 's instructions for use, a value of less than 500 ng/mL (FEU) may help to exclude DVT or PE in outpatients  when the assay is used with a clinical pretest probability assessment.(AEMR must utilize and document eCalc 'Wells Score Deep Vein Thrombosis Risk' for DVT exclusion only. Emergency Department should utilize  Guidelines for Emergency Department Use of the VTE Exclusion D-Dimer and Clinical Pretest probability assessment model for DVT or PE exclusion.)   TROPONIN SERIES- (INITIAL, 1 HR)    Narrative:     The following orders were created for panel order Troponin I Series, High Sensitivity (0, 1 HR).  Procedure                               Abnormality         Status                     ---------                               -----------         ------                     Troponin I, High Sensiti...[[id number]]  Normal              Final result               Troponin, High Sensitivi...[[id number]]  Normal              Final result                 Please view results for these tests on the individual orders.   CBC WITH AUTO DIFFERENTIAL    WBC 6.6      nRBC 0.0      RBC 5.29      Hemoglobin 16.2      Hematocrit 47.7      MCV 90      MCH 30.6      MCHC 34.0      RDW 12.7      Platelets 211      Neutrophils % 61.4      Immature Granulocytes %, Automated 0.3      Lymphocytes % 24.2      Monocytes % 6.4      Eosinophils % 6.5      Basophils % 1.2      Neutrophils Absolute 4.05      Immature Granulocytes Absolute, Automated 0.02      Lymphocytes Absolute 1.60      Monocytes Absolute 0.42      Eosinophils Absolute 0.43      Basophils Absolute 0.08         XR chest 1 view   Final Result   No evidence of acute intrathoracic abnormality.        Signed by: Mitch Jackson 10/7/2024 3:41 PM   Dictation workstation:   [id number]          Diagnostic testing considered:         Review of recent and relevant records:    ED Medication Administration:     Prescription Medication Consideration/Given:     Social Determinants of Health Significantly Affecting Care:      Summary:    /78 (10/07/24 1633)    Temp      Pulse 54 (10/07/24  "1633)   Resp 16 (10/07/24 1633)    SpO2 98 % (10/07/24 1633)      Abnormal Labs Reviewed - No abnormal labs to display    Diagnostic evaluation was completed.  Initial troponin was negative.  BNP is in the normal range so do not suspect CHF.  D-dimer was negative so I do not suspect PE or dissection.  Metabolic panel shows a normal glucose.  Sodium and potassium are normal.  Renal and liver function tests are normal.  CBC shows a normal white blood cell count and no evidence of anemia.  Chest x-ray shows no evidence of acute intrathoracic abnormality.    Heart score is 2.    Second troponin was also negative.    I utilized an evidence-based risk rating tool (CMT) along with my training and experience to weigh the risk of discharge against the risks of further testing, imaging, or hospitalization. At this time I estimate the risks of additional testing, imaging, or hospitalization to be equal to or greater than the risk of discharge. I discussed my risk assessment with the patient and the patient consents to the risk of discharge as well as the risk of uncertainty in estimating outcomes.    The patient's HEART Score is <4. In rare cases, I give patients with HEART Score of 4 the option of discharge, but only when they meet criteria for \"Low 4,\" meaning that HST was used, and the 4 is not from a highly suspicious story, highly suspicious EKG, or positive cardiac enzymes. In these selected cases, the risk of a \"Low 4\" is still most likely lower than the risk of admission and further testing/imaging. [id number]        The exact cause of the patient's pain is unclear at this time.  However no urgent or emergent conditions are suspected at this time.  I do not believe the patient is suffering from acute coronary syndrome, PE or dissection.    The patient will be discharged home with short-term follow-up with primary care as well as cardiology for additional workup and evaluation as an outpatient.    I discussed the " results and discharge plan with the patient and/or family/friend if present.  I emphasized the importance of follow up with the physician I referred them to in the timeframe recommended.  I explained reasons for the patient to return to the Emergency Department.  Questions were addressed.  The patient and/or family/friend expressed understanding.        Diagnoses as of 10/07/24 1724   Chest pain, unspecified type         Procedure  Procedures     Artie FUENTES MD  10/07/24 1720

## 2024-10-08 ENCOUNTER — OFFICE VISIT (OUTPATIENT)
Dept: CARDIOLOGY | Facility: CLINIC | Age: 49
End: 2024-10-08
Payer: COMMERCIAL

## 2024-10-08 ENCOUNTER — APPOINTMENT (OUTPATIENT)
Dept: CARDIOLOGY | Facility: CLINIC | Age: 49
End: 2024-10-08
Payer: COMMERCIAL

## 2024-10-08 VITALS
WEIGHT: 185.7 LBS | HEIGHT: 71 IN | SYSTOLIC BLOOD PRESSURE: 132 MMHG | HEART RATE: 76 BPM | DIASTOLIC BLOOD PRESSURE: 80 MMHG | BODY MASS INDEX: 26 KG/M2

## 2024-10-08 DIAGNOSIS — R06.02 SHORTNESS OF BREATH AT REST: ICD-10-CM

## 2024-10-08 DIAGNOSIS — R07.9 CHEST PAIN, UNSPECIFIED TYPE: ICD-10-CM

## 2024-10-08 DIAGNOSIS — R07.89 CHEST PAIN, MIDSTERNAL: ICD-10-CM

## 2024-10-08 DIAGNOSIS — E78.2 MIXED HYPERLIPIDEMIA: ICD-10-CM

## 2024-10-08 DIAGNOSIS — Z82.49 FAMILY HISTORY OF ISCHEMIC HEART DISEASE: ICD-10-CM

## 2024-10-08 DIAGNOSIS — Z76.89 ENCOUNTER TO ESTABLISH CARE WITH NEW DOCTOR: ICD-10-CM

## 2024-10-08 DIAGNOSIS — F17.200 CURRENT EVERY DAY SMOKER: ICD-10-CM

## 2024-10-08 DIAGNOSIS — R94.31 ABNORMAL EKG: ICD-10-CM

## 2024-10-08 PROCEDURE — 3008F BODY MASS INDEX DOCD: CPT | Performed by: INTERNAL MEDICINE

## 2024-10-08 PROCEDURE — 99204 OFFICE O/P NEW MOD 45 MIN: CPT | Performed by: INTERNAL MEDICINE

## 2024-10-08 RX ORDER — NAPROXEN 500 MG/1
500 TABLET ORAL
COMMUNITY
End: 2024-10-09 | Stop reason: SDUPTHER

## 2024-10-08 NOTE — PROGRESS NOTES
Referred by Dr. Ashley, Artie FUENTES MD provider found for   Chief Complaint   Patient presents with    New Patient Visit     ER follow-up from [facility] on 10/7/2024 for chest pain, neck pain & numbness down the L arm into fingers        History of Present Illness  Tiburcio Peterson Jr. is a 49 y.o. year old male patient is here for cardiac evaluation.  He is a patient who had history of tobacco abuse history of hyperlipidemia and positive family history of premature coronary disease.  He had an episode of chest pain with neck pain.  He went to emergency room and workup was negative.  His EKG showed sinus rhythm and poor R wave progression.  The patient stated that he has also a lot of anxiety.  He stated that he is a smoker as well.  I discussed with the patient in great length that his symptoms appear to be atypical however in view of her multiple risk factors for coronary disease we will go ahead with stress test.  The patient stated he cannot walk on treadmill because he has had bilateral leg surgery or an accident.  Therefore we will go ahead with Lexiscan stress test.  Will also repeat his lipids since his last lipid was about a year ago which was elevated.  He will see me back after testings are done    Past Medical History  History reviewed. No pertinent past medical history.    Social History  Social History     Tobacco Use    Smoking status: Every Day     Current packs/day: 0.50     Average packs/day: 0.5 packs/day for 20.0 years (10.0 ttl pk-yrs)     Types: Cigarettes    Smokeless tobacco: Never    Tobacco comments:     Patient understands that continued smoking will increase the risks of lung disease, vascular disease, and multiple malignancies.   Vaping Use    Vaping status: Never Used   Substance Use Topics    Alcohol use: Yes     Comment: socially    Drug use: Never       Family History     Family History   Problem Relation Name Age of Onset    Diabetes type II Mother      Heart attack Father      Prostate  cancer Father         Review of Systems  As per HPI, all other systems reviewed and negative.    Allergies:  No Known Allergies     Outpatient Medications:  Current Outpatient Medications   Medication Instructions    albuterol 90 mcg/actuation inhaler 2 puffs, inhalation, Every 4 hours PRN    aspirin 81 mg, oral, Daily    cyclobenzaprine (FLEXERIL) 10 mg, oral, Nightly PRN    naproxen (EC NAPROSYN) 500 mg, oral, 2 times daily (morning and late afternoon), Do not crush, chew, or split.         Vitals:  Vitals:    10/08/24 1313   BP: 132/80   Pulse: 76       Physical Exam:  Physical Exam  Constitutional:       Appearance: Normal appearance.   HENT:      Head: Normocephalic and atraumatic.   Eyes:      Extraocular Movements: Extraocular movements intact.      Pupils: Pupils are equal, round, and reactive to light.   Cardiovascular:      Rate and Rhythm: Normal rate and regular rhythm.      Pulses: Normal pulses.      Heart sounds: Normal heart sounds.   Pulmonary:      Effort: Pulmonary effort is normal.      Breath sounds: Normal breath sounds.   Abdominal:      General: Abdomen is flat.      Palpations: Abdomen is soft.   Musculoskeletal:      Right lower leg: No edema.      Left lower leg: No edema.   Skin:     General: Skin is warm and dry.   Neurological:      General: No focal deficit present.      Mental Status: He is alert and oriented to person, place, and time.             Assessment/Plan   Diagnoses and all orders for this visit:  Chest pain, unspecified type  -     Referral to Cardiology  Mixed hyperlipidemia  Chest pain, midsternal  Shortness of breath at rest  BMI 26.0-26.9,adult  Current every day smoker  Encounter to establish care with new doctor          Nilam Mckenzie MD [facility]  Interventional Cardiology   [facility]     Thank you for allowing me to participate in the care of this patient. Please do not hesitate to contact me with any further questions or concerns.

## 2024-10-08 NOTE — PATIENT INSTRUCTIONS
Patient to follow up after testing with Dr. Nilam Mckenzie MD [facility]     Office will arrange Nuclear Lexiscan Stress Test in near future.     Please repeat lab work in near future for cholesterol- orders in system.     No other changes today.   Continue same medications and treatments.   Patient educated on proper medication use.   Patient educated on risk factor modification.   Please bring any lab results from other providers / physicians to your next appointment.     Please bring all medicines, vitamins, and herbal supplements with you when you come to the office.     Prescriptions will not be filled unless you are compliant with your follow up appointments or have a follow up appointment scheduled as per instruction of your physician. Refills should be requested at the time of your visit.    Tristan MONDRAGON RN am scribing for and in the presence of Dr. Nilam Mckenzie MD [facility]

## 2024-10-09 ENCOUNTER — LAB (OUTPATIENT)
Dept: LAB | Facility: LAB | Age: 49
End: 2024-10-09
Payer: COMMERCIAL

## 2024-10-09 ENCOUNTER — OFFICE VISIT (OUTPATIENT)
Dept: PRIMARY CARE | Facility: CLINIC | Age: 49
End: 2024-10-09
Payer: COMMERCIAL

## 2024-10-09 VITALS
TEMPERATURE: 98.1 F | HEIGHT: 70 IN | OXYGEN SATURATION: 96 % | WEIGHT: 185.9 LBS | SYSTOLIC BLOOD PRESSURE: 121 MMHG | DIASTOLIC BLOOD PRESSURE: 78 MMHG | HEART RATE: 64 BPM | BODY MASS INDEX: 26.61 KG/M2

## 2024-10-09 DIAGNOSIS — R07.9 CHEST PAIN, UNSPECIFIED TYPE: Primary | ICD-10-CM

## 2024-10-09 DIAGNOSIS — R06.2 WHEEZING: ICD-10-CM

## 2024-10-09 DIAGNOSIS — E78.2 MIXED HYPERLIPIDEMIA: ICD-10-CM

## 2024-10-09 DIAGNOSIS — M54.2 NECK PAIN: ICD-10-CM

## 2024-10-09 DIAGNOSIS — M54.2 NECK PAIN: Primary | ICD-10-CM

## 2024-10-09 DIAGNOSIS — R94.31 ABNORMAL EKG: ICD-10-CM

## 2024-10-09 DIAGNOSIS — Z23 NEED FOR VACCINATION: ICD-10-CM

## 2024-10-09 DIAGNOSIS — L98.9 SKIN LESION: ICD-10-CM

## 2024-10-09 DIAGNOSIS — R07.9 CHEST PAIN, UNSPECIFIED TYPE: ICD-10-CM

## 2024-10-09 LAB
ALBUMIN SERPL BCP-MCNC: 4.7 G/DL (ref 3.4–5)
ANION GAP SERPL CALC-SCNC: 10 MMOL/L (ref 10–20)
BUN SERPL-MCNC: 15 MG/DL (ref 6–23)
CALCIUM SERPL-MCNC: 9.7 MG/DL (ref 8.6–10.3)
CHLORIDE SERPL-SCNC: 104 MMOL/L (ref 98–107)
CHOLEST SERPL-MCNC: 242 MG/DL (ref 0–199)
CHOLESTEROL/HDL RATIO: 5.1
CO2 SERPL-SCNC: 31 MMOL/L (ref 21–32)
CREAT SERPL-MCNC: 0.97 MG/DL (ref 0.5–1.3)
EGFRCR SERPLBLD CKD-EPI 2021: >90 ML/MIN/1.73M*2
GLUCOSE SERPL-MCNC: 84 MG/DL (ref 74–99)
HDLC SERPL-MCNC: 47.6 MG/DL
LDLC SERPL CALC-MCNC: 174 MG/DL
NON HDL CHOLESTEROL: 194 MG/DL (ref 0–149)
PHOSPHATE SERPL-MCNC: 4.2 MG/DL (ref 2.5–4.9)
POTASSIUM SERPL-SCNC: 4.4 MMOL/L (ref 3.5–5.3)
SODIUM SERPL-SCNC: 141 MMOL/L (ref 136–145)
TRIGL SERPL-MCNC: 103 MG/DL (ref 0–149)
VLDL: 21 MG/DL (ref 0–40)

## 2024-10-09 PROCEDURE — 99213 OFFICE O/P EST LOW 20 MIN: CPT | Performed by: FAMILY MEDICINE

## 2024-10-09 PROCEDURE — 90471 IMMUNIZATION ADMIN: CPT | Performed by: FAMILY MEDICINE

## 2024-10-09 PROCEDURE — 36415 COLL VENOUS BLD VENIPUNCTURE: CPT

## 2024-10-09 PROCEDURE — 80061 LIPID PANEL: CPT

## 2024-10-09 PROCEDURE — 80069 RENAL FUNCTION PANEL: CPT

## 2024-10-09 PROCEDURE — 3008F BODY MASS INDEX DOCD: CPT | Performed by: FAMILY MEDICINE

## 2024-10-09 PROCEDURE — 90673 RIV3 VACCINE NO PRESERV IM: CPT | Performed by: FAMILY MEDICINE

## 2024-10-09 RX ORDER — ALBUTEROL SULFATE 90 UG/1
2 INHALANT RESPIRATORY (INHALATION) EVERY 4 HOURS PRN
Qty: 18 G | Refills: 3 | Status: SHIPPED | OUTPATIENT
Start: 2024-10-09

## 2024-10-09 RX ORDER — NAPROXEN 500 MG/1
500 TABLET ORAL
Qty: 30 TABLET | Refills: 0 | Status: SHIPPED | OUTPATIENT
Start: 2024-10-09 | End: 2024-10-16 | Stop reason: CLARIF

## 2024-10-09 ASSESSMENT — PATIENT HEALTH QUESTIONNAIRE - PHQ9
1. LITTLE INTEREST OR PLEASURE IN DOING THINGS: NOT AT ALL
2. FEELING DOWN, DEPRESSED OR HOPELESS: NOT AT ALL
SUM OF ALL RESPONSES TO PHQ9 QUESTIONS 1 AND 2: 0

## 2024-10-09 NOTE — PATIENT INSTRUCTIONS
Schedule with pain management as dicussed.  Use the naproxen and cyclobenzaprine as needed.    Proceed with stress test on 10/18/2024.    It was a pleasure to see you today. Thank you for choosing us for your health care needs.    If you have lab or other testing completed and have not been informed of results within one week, please call the office for your results.    If you haven't done so, consider signing up for [facility] [employer], the [facility] personal health record. Ask the staff how you can get started.

## 2024-10-09 NOTE — PROGRESS NOTES
Subjective   Patient ID: Tiburcio Peterson Jr. is a 49 y.o. male who presents for Hospital Follow-up.    HPI       Patient was seen at [facility] on 10/07/2024 for left side chest pain radiating to neck and down the left arm which caused numbness in the arm.  Patient states he had labs done, EKG.  Cardiology ordered a stress test at [facility] on 10/8/2024.    Patient presented to the ER for numbness in his arm and chest pain. He said the intensity of his pain was what caused him to go for evaluation as this was more painful than his baseline.   Patient reports a 2-4/10 pain that can spike up to an 8/10 rating; this pain has been occurring for years, not months.  Patient reports that resting helps the pain subside or go away entirely  There is a spot on the left side of his neck that when palpitated, he reports pain runs to the back of his shoulder  He was told that OTC medication and muscle relaxers are his best method of treatment but no further treatment plans were discussed.  He has had an MRI on his neck done in 2022 that did not show any explanation for his currently symptoms  States that over the course of time spent with his symptoms, they have recently gotten worse  Patient reports being overwhelmed and stressed, which adds to the pain      Patient would like to see a derm for evaluation of some skin lesions noted.  He would also like them to investigate a spot on his left cheek.      Review of Systems    Cardiovascular: Patient denies any tachycardia, palpitations, orthopnea, or paroxysmal nocturnal dyspnea.  SEE HPI ALSO.    Respiratory: Patient denies any cough, shortness breath, or wheezing.  Gastrointestinal: Patient denies any nausea, vomiting, diarrhea, constipation, melena, hematochezia, or reflux symptoms  Skin: Denies any rashes or skin lesions  Neurology: Patient denies any new motor or sensory losses. Denies any numbness, tingling, weakness, and incoordination of the extremities. Patient  "also denies any tremor, seizures, or gait instability.  Endocrinology: Denies any polyuria, polydipsia, polyphagia, or heat/cold intolerance.  Psychiatric: He denies any depression, anxiety, or suicidal/homicidal ideation.    Objective   /78   Pulse 64   Temp 36.7 °C (98.1 °F) (Temporal)   Ht 1.778 m (5' 10\")   Wt 84.3 kg (185 lb 14.4 oz)   SpO2 96%   BMI 26.67 kg/m²     Physical Exam    General Appearance: Alert and cooperative, in no acute distress, well-developed/well-nourished overweight male    Neck: Supple and without adenopathy or rigidity. There is no JVD at 90° and no carotid bruits are noted. There is no thyromegaly, thyroid tenderness, or palpable thyroid nodules. Tenderness to palpation to the lateral aspect of the lower neck on the left. No palpable masses. No adenopathy. No obvious spasm. Spurling's test is negative.       Heart: Regular rate and rhythm without murmur or ectopy.  Lungs: Clear to auscultation bilaterally with good air exchange.  Skin: Good turgor, moist, warm and without rashes or lesions.  Neurological exam: Alert and oriented ×3, no tremor, normal gait.  Extremities: No clubbing, cyanosis, or edema  Psychiatric: Appropriate mood and affect, good insight and judgment, no delusions or thought disorders, no suicidal or homicidal ideation    Assessment/Plan     1. Chest pain, unspecified type; neck pain  He would likely benefit from  Pain Management. He is hesitant to go but is agreeable to scheduling an appointment with Pain Management.     - Referral to Pain Management    2. Need for vaccination  Influenza vaccination offered, patient accepted, vaccine administered in office.  - Flu vaccine, trivalent, preservative-free, age 6 months and greater (Fluarix/Fluzone/Flulaval)      3. Wheezing  Stable based on symptoms.  Uses albuterol MDI as needed with good results.  Continue with the same.    4. Skin lesion  We will refer the patient to a dermatologist to further assess the " blemishes on his face      Follow up in three months or sooner if needed.     Scribe Attestation  By signing my name below, ICathleen Scribe   attest that this documentation has been prepared under the direction and in the presence of Cyril Garcia DO.    Requested Prescriptions     Pending Prescriptions Disp Refills    naproxen (EC Naprosyn) 500 mg EC tablet 30 tablet 0     Sig: Take 1 tablet (500 mg) by mouth 2 times daily (morning and late afternoon). Do not crush, chew, or split.    albuterol 90 mcg/actuation inhaler 18 g 3     Sig: Inhale 2 puffs every 4 hours if needed for wheezing or shortness of breath.     Orders Placed This Encounter   Procedures    [place] pain management    Flu vaccine, trivalent, preservative free, age 6 months and greater (Fluarix/Fluzone/Flulaval)    Referral to Dermatology

## 2024-10-11 ENCOUNTER — TELEPHONE (OUTPATIENT)
Dept: CARDIOLOGY | Facility: CLINIC | Age: 49
End: 2024-10-11
Payer: COMMERCIAL

## 2024-10-11 DIAGNOSIS — E78.2 MIXED HYPERLIPIDEMIA: ICD-10-CM

## 2024-10-11 NOTE — TELEPHONE ENCOUNTER
----- Message from Nilam Mckenzie sent at 10/10/2024 11:45 AM EDT -----  Cholesterol is elevated needs to start atorvastatin 40 mg p.o. daily repeat lipids in 4 weeks  ----- Message -----  From: Lab, Background User  Sent: 10/9/2024   5:37 PM EDT  To: Nilam Mckenzie MD

## 2024-10-16 ENCOUNTER — TELEPHONE (OUTPATIENT)
Dept: PRIMARY CARE | Facility: CLINIC | Age: 49
End: 2024-10-16
Payer: COMMERCIAL

## 2024-10-16 DIAGNOSIS — R07.9 CHEST PAIN, UNSPECIFIED TYPE: Primary | ICD-10-CM

## 2024-10-16 RX ORDER — NAPROXEN 500 MG/1
500 TABLET ORAL 2 TIMES DAILY PRN
Qty: 30 TABLET | Refills: 0 | Status: SHIPPED | OUTPATIENT
Start: 2024-10-16 | End: 2025-01-14

## 2024-10-16 RX ORDER — ATORVASTATIN CALCIUM 40 MG/1
40 TABLET, FILM COATED ORAL DAILY
Qty: 90 TABLET | Refills: 3 | Status: SHIPPED | OUTPATIENT
Start: 2024-10-16 | End: 2025-10-16

## 2024-10-16 NOTE — TELEPHONE ENCOUNTER
Patient informed and verbalized understanding per Dr. Nilam Mckenzie MD, FACC. Orders routed to provider for signing.

## 2024-10-16 NOTE — TELEPHONE ENCOUNTER
Pt states naproxen 500mg EC that was recently sent isn't covered by his insurance. He says the regular naproxen is covered, but he is unsure if you wanted him to take that specific one or if its ok to send in the regular naproxen. Also he wanted to let you know that he has an apt for nuclear test this Friday and he says his cardiologist put him on a statin due to high cholesterol.

## 2024-10-16 NOTE — TELEPHONE ENCOUNTER
LM for patient to return call to receive results and medication change.     I did not pend orders or labs yet.

## 2024-10-18 ENCOUNTER — HOSPITAL ENCOUNTER (OUTPATIENT)
Dept: RADIOLOGY | Facility: CLINIC | Age: 49
Discharge: HOME | End: 2024-10-18
Payer: COMMERCIAL

## 2024-10-18 ENCOUNTER — HOSPITAL ENCOUNTER (OUTPATIENT)
Dept: CARDIOLOGY | Facility: CLINIC | Age: 49
Discharge: HOME | End: 2024-10-18
Payer: COMMERCIAL

## 2024-10-18 DIAGNOSIS — R07.89 CHEST PAIN, MIDSTERNAL: Primary | ICD-10-CM

## 2024-10-18 DIAGNOSIS — R94.31 ABNORMAL EKG: ICD-10-CM

## 2024-10-18 DIAGNOSIS — R07.9 CHEST PAIN, UNSPECIFIED TYPE: ICD-10-CM

## 2024-10-18 DIAGNOSIS — R06.02 SHORTNESS OF BREATH AT REST: ICD-10-CM

## 2024-10-18 DIAGNOSIS — Z82.49 FAMILY HISTORY OF ISCHEMIC HEART DISEASE: ICD-10-CM

## 2024-10-18 PROCEDURE — 3430000001 HC RX 343 DIAGNOSTIC RADIOPHARMACEUTICALS: Performed by: INTERNAL MEDICINE

## 2024-10-18 PROCEDURE — 2500000004 HC RX 250 GENERAL PHARMACY W/ HCPCS (ALT 636 FOR OP/ED): Performed by: INTERNAL MEDICINE

## 2024-10-18 PROCEDURE — 93017 CV STRESS TEST TRACING ONLY: CPT

## 2024-10-18 PROCEDURE — A9502 TC99M TETROFOSMIN: HCPCS | Performed by: INTERNAL MEDICINE

## 2024-10-18 PROCEDURE — 78452 HT MUSCLE IMAGE SPECT MULT: CPT

## 2024-10-18 RX ORDER — REGADENOSON 0.08 MG/ML
0.4 INJECTION, SOLUTION INTRAVENOUS ONCE
Status: COMPLETED | OUTPATIENT
Start: 2024-10-18 | End: 2024-10-18

## 2024-10-24 ENCOUNTER — APPOINTMENT (OUTPATIENT)
Dept: CARDIOLOGY | Facility: CLINIC | Age: 49
End: 2024-10-24
Payer: COMMERCIAL

## 2024-10-24 VITALS
WEIGHT: 184.1 LBS | SYSTOLIC BLOOD PRESSURE: 110 MMHG | DIASTOLIC BLOOD PRESSURE: 60 MMHG | HEIGHT: 70 IN | HEART RATE: 56 BPM | BODY MASS INDEX: 26.36 KG/M2

## 2024-10-24 DIAGNOSIS — F17.200 CURRENT EVERY DAY SMOKER: ICD-10-CM

## 2024-10-24 DIAGNOSIS — E78.2 MIXED HYPERLIPIDEMIA: ICD-10-CM

## 2024-10-24 DIAGNOSIS — R94.31 ABNORMAL EKG: ICD-10-CM

## 2024-10-24 PROCEDURE — 99214 OFFICE O/P EST MOD 30 MIN: CPT | Performed by: INTERNAL MEDICINE

## 2024-10-24 PROCEDURE — 3008F BODY MASS INDEX DOCD: CPT | Performed by: INTERNAL MEDICINE

## 2024-10-24 NOTE — PROGRESS NOTES
Referred by Dr. Mcintyre ref. provider found provider found for   Chief Complaint   Patient presents with    Follow-up     Patient is present for follow up after testing.         History of Present Illness  Tiburcio Peterson Jr. is a 49 y.o. year old male patient here for follow-up.  Stress test was completely normal.  Did not have any symptoms of chest pain shortness of breath palpitations syncope or presyncope.  Still has some degree of left-sided discomfort but appears to be a twinge like sensation most likely related to spasm more than anything else.  Results were discussed with the patient great length open his cholesterol was elevated advised to take statin therapy but he is very hesitant about it.  We talked about risks and benefits including the risk of hyperlipidemia.  He understood.  I told him we need to repeat the blood work 4 weeks after statin therapy..  He will be discussing this with his wife and report back to me.  He will see me back as scheduled    Past Medical History  History reviewed. No pertinent past medical history.    Social History  Social History     Tobacco Use    Smoking status: Every Day     Current packs/day: 0.50     Average packs/day: 0.5 packs/day for 20.0 years (10.0 ttl pk-yrs)     Types: Cigarettes    Smokeless tobacco: Never    Tobacco comments:     Patient understands that continued smoking will increase the risks of lung disease, vascular disease, and multiple malignancies.   Vaping Use    Vaping status: Never Used   Substance Use Topics    Alcohol use: Yes     Comment: socially    Drug use: Never       Family History     Family History   Problem Relation Name Age of Onset    Diabetes type II Mother Cathie Peterson     Obesity Mother Cathie Peterson     Heart attack Father Tiburcio Peterson Sr.     Prostate cancer Father Tiburcio Peterson Sr.     Heart disease Paternal Grandfather Juventino Nicholas     Thyroid disease Sister Kemi Dey        Review of Systems  As per HPI, all other systems  reviewed and negative.    Allergies:  No Known Allergies     Outpatient Medications:  Current Outpatient Medications   Medication Instructions    albuterol 90 mcg/actuation inhaler 2 puffs, inhalation, Every 4 hours PRN    aspirin 81 mg, Daily    atorvastatin (LIPITOR) 40 mg, oral, Daily    cyclobenzaprine (FLEXERIL) 10 mg, oral, Nightly PRN    naproxen (NAPROSYN) 500 mg, oral, 2 times daily PRN, Take with food         Vitals:  Vitals:    10/24/24 0852   BP: 110/60   Pulse: 56       Physical Exam:  Physical Exam  Constitutional:       Appearance: Normal appearance.   HENT:      Head: Normocephalic.   Eyes:      Pupils: Pupils are equal, round, and reactive to light.   Cardiovascular:      Rate and Rhythm: Normal rate and regular rhythm.      Pulses: Normal pulses.      Heart sounds: Normal heart sounds.   Pulmonary:      Effort: Pulmonary effort is normal.      Breath sounds: Normal breath sounds.   Musculoskeletal:         General: Normal range of motion.   Skin:     General: Skin is warm and dry.   Neurological:      General: No focal deficit present.      Mental Status: He is alert and oriented to person, place, and time.             Assessment/Plan   Diagnoses and all orders for this visit:  Abnormal EKG  Mixed hyperlipidemia  BMI 26.0-26.9,adult  Current every day smoker          Nilam Mckenzie MD [facility]  Interventional Cardiology   of [facility]     Thank you for allowing me to participate in the care of this patient. Please do not hesitate to contact me with any further questions or concerns.

## 2024-10-24 NOTE — PATIENT INSTRUCTIONS
Continue same medications and treatments.   Patient educated on proper medication use.   Patient educated on risk factor modification.   Please bring any lab results from other providers / physicians to your next appointment.     Please bring all medicines, vitamins, and herbal supplements with you when you come to the office.     Prescriptions will not be filled unless you are compliant with your follow up appointments or have a follow up appointment scheduled as per instruction of your physician. Refills should be requested at the time of your visit.    FOLLOW UP IN 6 MONTHS    OBTAIN FASTING LAB WORK 4 WEEKS AFTER STARTING MEDICATION     Aaliyah MONDRAGON LPN, am scribing for and in the presence of Dr. Nilam Mckenzie MD, FACC

## 2024-10-29 ENCOUNTER — APPOINTMENT (OUTPATIENT)
Dept: CARDIOLOGY | Facility: CLINIC | Age: 49
End: 2024-10-29
Payer: COMMERCIAL

## 2025-03-28 ENCOUNTER — PATIENT MESSAGE (OUTPATIENT)
Dept: PRIMARY CARE | Facility: CLINIC | Age: 50
End: 2025-03-28
Payer: COMMERCIAL

## 2025-03-28 DIAGNOSIS — Z12.5 PROSTATE CANCER SCREENING: ICD-10-CM

## 2025-03-28 DIAGNOSIS — Z00.00 WELL ADULT EXAM: Primary | ICD-10-CM

## 2025-03-28 DIAGNOSIS — E55.9 VITAMIN D DEFICIENCY: ICD-10-CM

## 2025-04-02 LAB
25(OH)D3+25(OH)D2 SERPL-MCNC: 33 NG/ML (ref 30–100)
ALBUMIN SERPL-MCNC: 4.6 G/DL (ref 3.6–5.1)
ALP SERPL-CCNC: 68 U/L (ref 36–130)
ALT SERPL-CCNC: 17 U/L (ref 9–46)
ANION GAP SERPL CALCULATED.4IONS-SCNC: 7 MMOL/L (CALC) (ref 7–17)
AST SERPL-CCNC: 15 U/L (ref 10–40)
BILIRUB SERPL-MCNC: 1.1 MG/DL (ref 0.2–1.2)
BUN SERPL-MCNC: 13 MG/DL (ref 7–25)
CALCIUM SERPL-MCNC: 9.7 MG/DL (ref 8.6–10.3)
CHLORIDE SERPL-SCNC: 106 MMOL/L (ref 98–110)
CHOLEST SERPL-MCNC: 217 MG/DL
CHOLEST/HDLC SERPL: 4.2 (CALC)
CO2 SERPL-SCNC: 28 MMOL/L (ref 20–32)
CREAT SERPL-MCNC: 0.91 MG/DL (ref 0.6–1.29)
EGFRCR SERPLBLD CKD-EPI 2021: 104 ML/MIN/1.73M2
GLUCOSE SERPL-MCNC: 82 MG/DL (ref 65–99)
HDLC SERPL-MCNC: 52 MG/DL
LDLC SERPL CALC-MCNC: 141 MG/DL (CALC)
NONHDLC SERPL-MCNC: 165 MG/DL (CALC)
POTASSIUM SERPL-SCNC: 4.8 MMOL/L (ref 3.5–5.3)
PROT SERPL-MCNC: 7.1 G/DL (ref 6.1–8.1)
PSA SERPL-MCNC: 0.98 NG/ML
SODIUM SERPL-SCNC: 141 MMOL/L (ref 135–146)
TRIGL SERPL-MCNC: 119 MG/DL

## 2025-04-08 ENCOUNTER — APPOINTMENT (OUTPATIENT)
Dept: PRIMARY CARE | Facility: CLINIC | Age: 50
End: 2025-04-08
Payer: COMMERCIAL

## 2025-04-08 VITALS
OXYGEN SATURATION: 98 % | WEIGHT: 185.2 LBS | HEART RATE: 78 BPM | DIASTOLIC BLOOD PRESSURE: 77 MMHG | BODY MASS INDEX: 26.51 KG/M2 | HEIGHT: 70 IN | SYSTOLIC BLOOD PRESSURE: 114 MMHG | TEMPERATURE: 97.7 F

## 2025-04-08 DIAGNOSIS — M54.50 CHRONIC BILATERAL LOW BACK PAIN WITHOUT SCIATICA: ICD-10-CM

## 2025-04-08 DIAGNOSIS — R53.83 OTHER FATIGUE: ICD-10-CM

## 2025-04-08 DIAGNOSIS — R16.0 LIVER MASS: ICD-10-CM

## 2025-04-08 DIAGNOSIS — R05.3 CHRONIC COUGH: ICD-10-CM

## 2025-04-08 DIAGNOSIS — Z00.00 ANNUAL PHYSICAL EXAM: Primary | ICD-10-CM

## 2025-04-08 DIAGNOSIS — G89.29 CHRONIC BILATERAL LOW BACK PAIN WITHOUT SCIATICA: ICD-10-CM

## 2025-04-08 DIAGNOSIS — Z13.6 SCREENING FOR HEART DISEASE: ICD-10-CM

## 2025-04-08 DIAGNOSIS — R22.2 CHEST MASS: ICD-10-CM

## 2025-04-08 DIAGNOSIS — E78.2 MIXED HYPERLIPIDEMIA: ICD-10-CM

## 2025-04-08 PROCEDURE — 3008F BODY MASS INDEX DOCD: CPT | Performed by: PHYSICIAN ASSISTANT

## 2025-04-08 PROCEDURE — 99214 OFFICE O/P EST MOD 30 MIN: CPT | Performed by: PHYSICIAN ASSISTANT

## 2025-04-08 PROCEDURE — 99396 PREV VISIT EST AGE 40-64: CPT | Performed by: PHYSICIAN ASSISTANT

## 2025-04-08 RX ORDER — NAPROXEN 500 MG/1
500 TABLET ORAL 2 TIMES DAILY PRN
Qty: 60 TABLET | Refills: 5 | Status: SHIPPED | OUTPATIENT
Start: 2025-04-08 | End: 2025-10-05

## 2025-04-08 ASSESSMENT — PATIENT HEALTH QUESTIONNAIRE - PHQ9
SUM OF ALL RESPONSES TO PHQ9 QUESTIONS 1 AND 2: 0
1. LITTLE INTEREST OR PLEASURE IN DOING THINGS: NOT AT ALL
2. FEELING DOWN, DEPRESSED OR HOPELESS: NOT AT ALL

## 2025-04-08 NOTE — PROGRESS NOTES
Subjective   Patient ID: Tiburcio Peterson Jr. is a 50 y.o. male who presents for Annual Exam.    HPI         Patient here for annual physical.    Review labs: 04/01/2025- includes PSA  Colon: 02/250/2023  PSA NL  Vit D 33  CMP NL  Lipid:  (was 174), Tri 119, HDL 52 (taking 2000mg of fish oil)  CBC done 10/24, NL  CURRENT SMOKER 1/2 ppd x 20+ yrs  Diet no seed oil, low carb  Weight is stable.  Exercise:    Patient would like to discuss any liver blood work and gallbladder.   He has a history of a liver mass on MRI from 2020 and and did not ever get the MRI with and without contrast to reevaluate size. He states that he is wanting to get that angel done today. His most recent CMP showed LFTs to be NL. He denies stool color changes.     Patient has left chest bone and shoulder pain with numbness on bilateral fingers.   He states the pain also radiates to his upper trapezius and the scapula.  Pain is chronic, worsens with overuse.  Does improved with microneedling, ultrasound, and massage.  He takes his wife's naproxen and it does improve the pain slightly. He would like his own RX  of Naproxen.  Cough for over a yr with non productivity. He is a smoker and wants to have his chest imaged to r/o cancer. He denies shortness of breath with activity, actual lung pain.        HLD-patient sees Dr. Mckenzie at least once yearly and was last told by Dr. Mckenzie to be on Lipitor 20 mg daily.  Patient has not taken 1 single pill of that statin and has instead used over-the-counter fish oil 2000 g every day.  His most recent LDL dropped from 174 down to 141.  He denies any symptoms of unstable angina and has been evaluated 6 months ago in the ED for chest pain to include a cardiac workup.  He had a normal cardiac calcium score in 2020.    Chronic lower back pain without sciatica-longstanding issue related to overuse.  Patient would like a prescription of naproxen for this.  He denies any bowel or bladder dysfunction. Pain is  "stable and predictable.      Review of Systems  Constitutional: Patient denies any fever, chills, loss of appetite, or unexplained weight loss. + fatigue  Cardiovascular: Patient denies any chest pain, shortness of breath with exertion, tachycardia, palpitations, orthopnea, or paroxysmal nocturnal dyspnea.  Respiratory: Patient denies any cough, shortness breath, or wheezing.  Gastrointestinal patient denies any nausea, vomiting, diarrhea, constipation, melena, hematochezia, or reflux symptoms  Skin: Denies any rashes or skin lesions   Neurology: Patient denies any new motor or sensory losses.  Denies any numbness, tingling, weakness, and incoordination of the extremities.  Patient also denies any tremor, seizures, or gait instability.  Endocrinology: Denies any polyuria, polydipsia, polyphagia, or heat/cold intolerance.    Objective   /77 (BP Location: Left arm, Patient Position: Sitting, BP Cuff Size: Small adult)   Pulse 78   Temp 36.5 °C (97.7 °F) (Temporal)   Ht 1.765 m (5' 9.5\")   Wt 84 kg (185 lb 3.2 oz)   SpO2 98%   BMI 26.96 kg/m²     Physical Exam  Chest:          Comments: Tender, \"knot\" approx 1CM x .75 CM, tender to deep palpation, non mobile. Shoots pain down arm when touched    Gen. appearance: Alert and cooperative, in no acute distress, well-developed, well-nourished male.  Easily cries over health concerns  Neck: Supple and without adenopathy or rigidity.  There is no JVD at 90° and no carotid bruits are noted.  There is no thyromegaly, thyroid tenderness, or palpable thyroid nodules.  Heart: Regular rate and rhythm without murmur or ectopy.  Lungs: Lungs are clear to auscultation bilaterally with good air exchange.  Skin: Good turgor, moist, warm and without rashes or lesions.  Neurological exam: Alert and oriented ×3, no tremor, normal gait.  Extremities: No clubbing, cyanosis, or edema    Assessment/Plan   Diagnoses and all orders for this visit:  Annual physical exam:  Review labs: " 04/01/2025- includes PSA  Colon: 02/250/2023  PSA NL  Vit D 33  CMP NL  Lipid:  (was 174), Tri 119, HDL 52 (taking 2000mg of fish oil)  CBC done 10/24, NL  CURRENT SMOKER 1/2 ppd x 20+ yrs  Diet no seed oil, low carb  Weight is stable.  Exercise: walking     Mixed hyperlipidemia-patient has refused to start on a statin for fear of dementia later in life.  He is currently taking 2000 g of fish oil daily and his LDL has dropped from 174-141.  Smoking cessation was advised to help decrease his risk of CAD.  He will continue to see Dr. Mckenzie yearly for reevaluation.  A stress test was done in October that was normal.  I did however advise that he have a cardiac calcium score to help reassure him that his heart is fine because he seems to be very concerned about this.    Chest mass  -     CT chest wo IV contrast; Future  -     Follow Up In [facility] - PCP - Established; Future  Suspect musculoskeletal given that the pain is intermittent and does improve some with anti-inflammatory use and manipulation.  He is again very concerned about cancer so with his chronic cough I went ahead and ordered a CT chest.  Again, smoking cessation was advised.  Patient understands that continued smoking will increase the risks of lung disease, vascular disease, and multiple malignancies.  Patient is to return to clinic in 1 month and we will consider adding in amitriptyline nightly to help with pain and his anxious/depressed demeanor.    Liver mass  -     MR liver w and wo IV contrast; Future  Longstanding issue found in 2020.  LFTs have been normal since.  Patient neglected to have this repeat MRI for mass reevaluation over the past 5 years.  Repeat requisition was ordered today.  Compliance with this issue was highly stressed.    Chronic cough  -     CT chest wo IV contrast; Future  No suspicious findings on clinical exam.  Patient is high risk with smoking.  Again, CT chest was ordered.    Chronic bilateral low back  pain without sciatica  -     naproxen (Naprosyn) 500 mg tablet; Take 1 tablet (500 mg) by mouth 2 times a day as needed for mild pain (1 - 3) (pain).  Patient will start taking naproxen of his own 500 mg twice daily as needed for pain.    Other fatigue  -     Testosterone,Free and Total; Future  -     TSH with reflex to Free T4 if abnormal; Future  Chronic issue.  Testosterone and thyroid were ordered for evaluation    Screening for heart disease  -     CT cardiac scoring wo IV contrast; Future    Patient is to return to the clinic in 1 month for reevaluation of his pain and we will consider Elavil and pain management referral.    Patient understands that should they have testing outside   facilities that we may not receive the results and was told to call us if they have not heard from our office within a week after testing.     Please be aware that any referrals discussed today should be placed today within our office.    Tests last labs ordered should also result in prompt scheduling today as you leave the office.  If not done today then a phone call for scheduling is expected in a timely manner-within 2 weeks.  If testing is done-a result should be available to patient within 2 weeks time unless otherwise specified.   You, the patient or caregiver, are responsible for making sure what is discussed is actually scheduled and completed.  If suboptimal understanding of results, tests, referral reasons occurs it is understood that a follow-up appointment with me should be made to discuss and clarify the understanding.  Follow-up at next scheduled visit as planned or discussed during visit is expected.  You are to return sooner if new or unresolved issues of concern occur.        [facility] uses voice recognition technology for dictations. Sometimes the software misinterprets words. Please take this into account when reading this.

## 2025-04-17 ENCOUNTER — APPOINTMENT (OUTPATIENT)
Dept: CARDIOLOGY | Facility: CLINIC | Age: 50
End: 2025-04-17
Payer: COMMERCIAL

## 2025-04-17 ENCOUNTER — PATIENT MESSAGE (OUTPATIENT)
Dept: CARDIOLOGY | Facility: CLINIC | Age: 50
End: 2025-04-17

## 2025-04-17 VITALS
DIASTOLIC BLOOD PRESSURE: 62 MMHG | BODY MASS INDEX: 25.87 KG/M2 | SYSTOLIC BLOOD PRESSURE: 102 MMHG | HEIGHT: 70 IN | HEART RATE: 68 BPM | WEIGHT: 180.7 LBS

## 2025-04-17 DIAGNOSIS — R94.31 ABNORMAL EKG: ICD-10-CM

## 2025-04-17 DIAGNOSIS — E78.2 MIXED HYPERLIPIDEMIA: ICD-10-CM

## 2025-04-17 DIAGNOSIS — F17.200 CURRENT EVERY DAY SMOKER: ICD-10-CM

## 2025-04-17 LAB
TESTOST FREE SERPL-MCNC: NORMAL PG/ML
TESTOST SERPL-MCNC: NORMAL NG/DL
TSH SERPL-ACNC: 0.8 MIU/L (ref 0.4–4.5)

## 2025-04-17 PROCEDURE — 3008F BODY MASS INDEX DOCD: CPT | Performed by: INTERNAL MEDICINE

## 2025-04-17 PROCEDURE — 99214 OFFICE O/P EST MOD 30 MIN: CPT | Performed by: INTERNAL MEDICINE

## 2025-04-17 RX ORDER — BISMUTH SUBSALICYLATE 262 MG
1 TABLET,CHEWABLE ORAL DAILY
COMMUNITY

## 2025-04-17 NOTE — PROGRESS NOTES
Referred by Dr. Mcintyre ref. provider found provider found for   Chief Complaint   Patient presents with    Follow-up     6 month follow up for HLD management.        Chief complaint:   Chief Complaint   Patient presents with    Follow-up     6 month follow up for HLD management.        History of Present Illness  Tiburcio Peterson Jr. is a 50 y.o. year old male patient here for follow-up.  Doing well from cardiac standpoint no complaint no symptoms of chest pain or shortness of breath.  Denies any symptoms of palpitations syncope or presyncope.  His stress test was completely normal.  Unfortunately he continues to smoke despite our advice.  Cholesterol still elevated and I told him he may need statin but he like to wait a little bit and modify his diet.  He has been following up with his primary care physician regarding his cholesterol.  He will follow-up with me as scheduled    Past Medical History  Medical History[1]    Social History  Social History[2]    Family History   Family History[3]    Review of Systems  As per HPI, all other systems reviewed and negative.    Allergies:  RX Allergies[4]     Outpatient Medications:  Current Outpatient Medications   Medication Instructions    albuterol 90 mcg/actuation inhaler 2 puffs, inhalation, Every 4 hours PRN    aspirin 81 mg, Daily    atorvastatin (LIPITOR) 40 mg, oral, Daily    cyclobenzaprine (FLEXERIL) 10 mg, oral, Nightly PRN    docosahexaenoic acid/epa (FISH OIL ORAL) 2,000 mg, Daily    multivitamin tablet 1 tablet, Daily    naproxen (NAPROSYN) 500 mg, oral, 2 times daily PRN    RED BEET ORAL Daily         Vitals:  Vitals:    04/17/25 0814   BP: 102/62   Pulse: 68       Physical Exam:  Physical Exam  Vitals and nursing note reviewed.   Constitutional:       Appearance: Normal appearance.   HENT:      Head: Normocephalic and atraumatic.   Eyes:      Extraocular Movements: Extraocular movements intact.      Pupils: Pupils are equal, round, and reactive to light.    Cardiovascular:      Rate and Rhythm: Normal rate and regular rhythm.      Pulses: Normal pulses.   Pulmonary:      Effort: Pulmonary effort is normal.      Breath sounds: Normal breath sounds.   Musculoskeletal:         General: Normal range of motion.      Cervical back: Normal range of motion.      Right lower leg: No edema.      Left lower leg: No edema.   Skin:     General: Skin is warm and dry.   Neurological:      General: No focal deficit present.      Mental Status: He is alert and oriented to person, place, and time.             Assessment/Plan   Diagnoses and all orders for this visit:  Abnormal EKG  Mixed hyperlipidemia  BMI 26.0-26.9,adult  Current every day smoker      Sharlene MONDRAGON LPN am scribing for, and in the presence of Nilam Mckenzie M.D..    Nilam MONDRAGON M.D., personally performed the services described in the documentation as scribed by Sharlene Cummings LPN in my presence, and confirm it is both accurate and complete.      Nilam Mckenzie MD [facility]  Interventional Cardiology   of [facility]     Thank you for allowing me to participate in the care of this patient. Please do not hesitate to contact me with any further questions or concerns.         [1] No past medical history on file.  [2]   Social History  Tobacco Use    Smoking status: Every Day     Current packs/day: 0.50     Average packs/day: 0.5 packs/day for 20.0 years (10.0 ttl pk-yrs)     Types: Cigarettes    Smokeless tobacco: Never    Tobacco comments:     Patient understands that continued smoking will increase the risks of lung disease, vascular disease, and multiple malignancies.   Vaping Use    Vaping status: Never Used   Substance Use Topics    Alcohol use: Yes     Alcohol/week: 2.0 standard drinks of alcohol     Types: 2 Cans of beer per week     Comment: socially    Drug use: Never   [3]   Family History  Problem Relation Name Age of Onset    Diabetes type II Mother Cathie Woodyard     Obesity Mother Cathie  Nicholas     Heart attack Father Tiburcio Peterson Sr.     Prostate cancer Father Tiburcio Peterson Sr.     Heart disease Paternal Grandfather Juventino Peterson     Thyroid disease Sister Kemi Dey    [4] No Known Allergies

## 2025-04-21 LAB
TESTOST FREE SERPL-MCNC: 53.8 PG/ML (ref 35–155)
TESTOST SERPL-MCNC: 361 NG/DL (ref 250–1100)
TSH SERPL-ACNC: 0.8 MIU/L (ref 0.4–4.5)

## 2025-04-28 ENCOUNTER — ANCILLARY PROCEDURE (OUTPATIENT)
Facility: HOSPITAL | Age: 50
End: 2025-04-28
Payer: COMMERCIAL

## 2025-04-28 DIAGNOSIS — R22.2 CHEST MASS: ICD-10-CM

## 2025-04-28 DIAGNOSIS — R05.3 CHRONIC COUGH: ICD-10-CM

## 2025-04-28 PROCEDURE — 71250 CT THORAX DX C-: CPT

## 2025-04-28 PROCEDURE — 71250 CT THORAX DX C-: CPT | Performed by: RADIOLOGY

## 2025-05-07 ENCOUNTER — APPOINTMENT (OUTPATIENT)
Dept: PRIMARY CARE | Facility: CLINIC | Age: 50
End: 2025-05-07
Payer: COMMERCIAL

## 2025-05-07 VITALS
TEMPERATURE: 98.4 F | HEIGHT: 70 IN | SYSTOLIC BLOOD PRESSURE: 125 MMHG | OXYGEN SATURATION: 98 % | DIASTOLIC BLOOD PRESSURE: 84 MMHG | HEART RATE: 64 BPM | BODY MASS INDEX: 25.57 KG/M2 | WEIGHT: 178.6 LBS

## 2025-05-07 DIAGNOSIS — E78.2 MIXED HYPERLIPIDEMIA: Primary | ICD-10-CM

## 2025-05-07 DIAGNOSIS — R22.2 CHEST MASS: ICD-10-CM

## 2025-05-07 DIAGNOSIS — R16.0 LIVER MASS: ICD-10-CM

## 2025-05-07 PROCEDURE — 3008F BODY MASS INDEX DOCD: CPT | Performed by: PHYSICIAN ASSISTANT

## 2025-05-07 PROCEDURE — 99213 OFFICE O/P EST LOW 20 MIN: CPT | Performed by: PHYSICIAN ASSISTANT

## 2025-05-07 PROCEDURE — 4004F PT TOBACCO SCREEN RCVD TLK: CPT | Performed by: PHYSICIAN ASSISTANT

## 2025-05-07 ASSESSMENT — PATIENT HEALTH QUESTIONNAIRE - PHQ9
2. FEELING DOWN, DEPRESSED OR HOPELESS: NOT AT ALL
SUM OF ALL RESPONSES TO PHQ9 QUESTIONS 1 AND 2: 0
1. LITTLE INTEREST OR PLEASURE IN DOING THINGS: NOT AT ALL

## 2025-05-07 NOTE — PROGRESS NOTES
Subjective   Patient ID: Tiburcio Peterson Jr. is a 50 y.o. male who presents for Follow-up.    HPI     Patient reports stating starting a dietary supplement.  Patient has changed his eating habits.  Patient has lost 2 more pounds since his visit 1 month ago      Review CT scan 04/28/2025, chest was clear of pathology  Colon: 02/20/2023  Labs done 4/17/25, rv'd  Testosterone normal  , triglycerides 119, HDL 52,  CMP normal  Vitamin D 33  PSA normal  TSH normal      Patient c/o having some manageable pain in the shoulder/ neck.   Pt thinks he should go to accu-puncture  Mechanical traction at work.   He declines pain management today and does not want to go on long term  heavy meds for pain at this point in time.   His CT chest that caught the neck region was WNL (he wanted to r/o cancer)  He would like to con't Naproxen, it is helping his pain.       HLD- not on lipitor. His LDL was 141, HDL 52, trig 119. He would like Lipo protein a checked at next angel.  He was given Lipitor (by Dr Mckenzie) but DECLINES taking it b/c he wants to manage shahnaz with diet/exercise instead. He denies any sx of unstable angina. He still con't to smoke despite my advice and encouragement to stop.    MRI liver printed at last visit, still not done.  Pt wants to take it to an outside facility to have done b/c it's cheaper.         Last ov   Patient would like to discuss any liver blood work and gallbladder.   He has a history of a liver mass on MRI from 2020 and and did not ever get the MRI with and without contrast to reevaluate size. He states that he is wanting to get that angel done today. His most recent CMP showed LFTs to be NL. He denies stool color changes.      Patient has left chest bone and shoulder pain with numbness on bilateral fingers.   He states the pain also radiates to his upper trapezius and the scapula.  Pain is chronic, worsens with overuse.  Does improved with microneedling, ultrasound, and massage.  He takes his  "wife's naproxen and it does improve the pain slightly. He would like his own RX  of Naproxen.  Cough for over a yr with non productivity. He is a smoker and wants to have his chest imaged to r/o cancer. He denies shortness of breath with activity, actual lung pain.          HLD-patient sees Dr. Mckenzie at least once yearly and was last told by Dr. Mckenzie to be on Lipitor 20 mg daily.  Patient has not taken 1 single pill of that statin and has instead used over-the-counter fish oil 2000 g every day.  His most recent LDL dropped from 174 down to 141.  He denies any symptoms of unstable angina and has been evaluated 6 months ago in the ED for chest pain to include a cardiac workup.  He had a normal cardiac calcium score in 2020.     Chronic lower back pain without sciatica-longstanding issue related to overuse.  Patient would like a prescription of naproxen for this.  He denies any bowel or bladder dysfunction. Pain is stable and predictable.       Review of Systems  Constitutional: Patient denies any fever, chills, loss of appetite, or unexplained weight loss.  Cardiovascular: Denies any chest pain, shortness of breath with exertion, tachycardia, palpitations.  Respiratory: Patient denies any cough, shortness of breath, or wheezing.  Skin:  Denies any rashes or skin lesions.    Objective   /84 (BP Location: Right arm, Patient Position: Sitting, BP Cuff Size: Adult)   Pulse 64   Temp 36.9 °C (98.4 °F) (Temporal)   Ht 1.765 m (5' 9.5\")   Wt 81 kg (178 lb 9.6 oz)   SpO2 98%   BMI 26.00 kg/m²     Physical Exam  Gen. appearance: Alert and cooperative, no acute distress, well-developed, well-nourished male.  Neck: Supple and without adenopathy or rigidity.  There is no JVD at 90° and no carotid bruits are noted.  Cardiovascular: Heart has a regular rate and rhythm without murmur or ectopy.  Respiratory: Lungs are clear to auscultation bilaterally with good air exchange.    Assessment/Plan   Diagnoses and all " orders for this visit:  Mixed hyperlipidemia  -     Lipid Panel; Future  -     Lipoprotein A (LPA); Future  -     Follow Up In [facility] - PCP - Established; Future  Patient is to have a repeat cholesterol with lipoprotein a in 3 months.  In the meantime he will try to watch the amount of cholesterol in his diet and increase physical activity and con't taking F.O..  He is currently without symptoms of unstable angina.  Understands that he is at higher risk for a heart attack and stroke because he is a smoker but still declines smoking cessation products or counseling to quit.    Chest Mass-CT chest was ordered and ruled out anything cancerous.  He was told to stop smoking.  He will follow-up in 6 months for reevaluation.  Patient feels reassured at this point in time but if any new symptoms appear he is to return to the clinic for further evaluation.    Liver mass-patient is taking the order for an MRI of the liver to an outside facility because it is cheaper than going through his insurance here at .  His LFTs have been normal with repeated checks and he is currently asymptomatic.  We will notify him of the result of the MRI once that has been reviewed.  He is scheduling that on his own.      Patient understands that continued smoking will increase the risks of lung disease, vascular disease, and multiple malignancies.    Patient is to return to the clinic in 6 months for an in-person 6 mo f/up but will have labs done at the 3-month karsten to recheck cholesterol.    Patient understands that should they have testing outside   facilities that we may not receive the results and was told to call us if they have not heard from our office within a week after testing.     Please be aware that any referrals discussed today should be placed today within our office.    Tests last labs ordered should also result in prompt scheduling today as you leave the office.  If not done today then a phone call for  scheduling is expected in a timely manner-within 2 weeks.  If testing is done-a result should be available to patient within 2 weeks time unless otherwise specified.   You, the patient or caregiver, are responsible for making sure what is discussed is actually scheduled and completed.  If suboptimal understanding of results, tests, referral reasons occurs it is understood that a follow-up appointment with me should be made to discuss and clarify the understanding.  Follow-up at next scheduled visit as planned or discussed during visit is expected.  You are to return sooner if new or unresolved issues of concern occur.        [facility] uses voice recognition technology for dictations. Sometimes the software misinterprets words. Please take this into account when reading this.

## 2025-05-14 ENCOUNTER — APPOINTMENT (OUTPATIENT)
Dept: DERMATOLOGY | Facility: CLINIC | Age: 50
End: 2025-05-14
Payer: COMMERCIAL

## 2025-05-14 DIAGNOSIS — L72.11 PILAR CYST: ICD-10-CM

## 2025-05-14 DIAGNOSIS — L82.1 SEBORRHEIC KERATOSIS: ICD-10-CM

## 2025-05-14 DIAGNOSIS — R20.8 SKIN PAIN: ICD-10-CM

## 2025-05-14 DIAGNOSIS — L90.5 SCAR CONDITIONS AND FIBROSIS OF SKIN: ICD-10-CM

## 2025-05-14 DIAGNOSIS — D23.9 DERMATOFIBROMA: ICD-10-CM

## 2025-05-14 DIAGNOSIS — Z12.83 SKIN CANCER SCREENING: ICD-10-CM

## 2025-05-14 DIAGNOSIS — D22.9 MULTIPLE BENIGN NEVI: Primary | ICD-10-CM

## 2025-05-14 PROCEDURE — 99203 OFFICE O/P NEW LOW 30 MIN: CPT | Performed by: DERMATOLOGY

## 2025-05-14 PROCEDURE — 4004F PT TOBACCO SCREEN RCVD TLK: CPT | Performed by: DERMATOLOGY

## 2025-05-14 NOTE — Clinical Note
Left postauricular crease superficial 2mm cyst  Left occipital scalp ~1.5cm firm yet rubbery subcutaneous nodule without overlying punctum

## 2025-05-14 NOTE — Clinical Note
- counseled that these are likely benign cysts but are often excised for path eval due to pain, swelling, evolution, etc  - counseled on the procedural aspects of removal of these cysts including surgical excision with suturing, presence of scar, risks including recurrence, bleeding, infection, and pain  - will start PA process to ensure insurance coverage    Benign nature of lesion discussed but, given new growth/pain/irritation, I recomend removal by excision. The excision was discussed with the patient. The risks, benefits, and potential adverse effects were reviewed. The discussion included, but was not limited, to the cure rate, relative cost, wound care requirements, activity restrictions, likely scar outcome and time to heal were reviewed. It was explained that the scar would be linger than the original lesion. A prior authorization is necessary for removal. Risks, benefits, side effects, alternatives and options were discussed with patient and the patient voiced understanding. The patient agrees with the plan as stated above.

## 2025-05-14 NOTE — Clinical Note
- Sun protective behavior reviewed and encouraged including the use of over-the-counter broad spectrum sunscreen with SPF30+ daily (reapply every 1-1.5 hours when outdoors), UPF clothing, broad rimmed hats, sunglasses, and avoidance of midday sun. Home skin monitoring encouraged and how to monitor for skin cancer (changing or new moles, new rapidly growing or non-healing lesions) reviewed.

## 2025-05-14 NOTE — PROGRESS NOTES
Subjective     Tiburcio Peterson Jr. is a 50 y.o. male who presents for the following: Skin Check (New. No reported hx. Pt reports areas of concern located on Left Thigh, Scalp, and Face. ).     Skin Cancer Screening  He has a history of heavy sun exposure. He is in the sun daily. He uses sunscreen rarely. He reports itching. His moles are darkening.    Spots that concern him:  Left Thigh, Scalp, and Face.     Pt says he has had some benign nevi removed for eval in past. Has a cyst on back of scalp and L ear he'd like removed.      Review of Systems:  No other skin or systemic complaints other than what is documented elsewhere in the note.    The following portions of the chart were reviewed this encounter and updated as appropriate:       Skin Cancer History  Biopsy Log Book  No skin cancers from Specimen Tracking.    Additional History      Specialty Problems          Dermatology Problems    Open wound of elbow    Formatting of this note might be different from the original. without mention of complication [id number]         Keratoderma     Past Medical History:  Tiburcio Peterson Jr.  has no past medical history on file.    Past Surgical History:  Tiburcio Peterson Jr.  has a past surgical history that includes Other surgical history (12/18/2019) and Other surgical history (12/18/2019).    Family History:  Patient family history includes Diabetes type II in his mother; Heart attack in his father; Heart disease in his paternal grandfather; Obesity in his mother; Prostate cancer in his father; Thyroid disease in his sister.       Objective   Well appearing patient in no apparent distress; mood and affect are within normal limits.    A full examination was performed including scalp, head, eyes, ears, nose, lips, neck, chest, axillae, abdomen, back, buttocks, bilateral upper extremities, bilateral lower extremities, hands, feet, fingers, toes, fingernails, and toenails. All findings within normal limits unless otherwise  noted below.    Assessment/Plan   Skin Exam  1. SCAR CONDITIONS AND FIBROSIS OF SKIN  Generalized  Scars well-healed without evidence of recurrence  -continue to monitor for cutaneous malignancy; no recurrence in scar area evident today    2. DERMATOFIBROMA  Left Thigh - Anterior  Gray-brown papule with central induration and dimple sign  This is a benign finding and requires no treatment.    3. PILAR CYST (2)  Left Ear, Scalp  Left postauricular crease superficial 2mm cyst  Left occipital scalp ~1.5cm firm yet rubbery subcutaneous nodule without overlying punctum  - counseled that these are likely benign cysts but are often excised for path eval due to pain, swelling, evolution, etc  - counseled on the procedural aspects of removal of these cysts including surgical excision with suturing, presence of scar, risks including recurrence, bleeding, infection, and pain  - will start PA process to ensure insurance coverage  Related Procedures  Prior Authorization for Skin Excision - Cysts  4. MULTIPLE BENIGN NEVI  Generalized  All nevi were symmetric brown macules without atypia on dermoscopy.     - Sun protective behavior reviewed and encouraged including the use of over-the-counter broad spectrum sunscreen with SPF30+ daily (reapply every 1-1.5 hours when outdoors), UPF clothing, broad rimmed hats, sunglasses, and avoidance of midday sun. Home skin monitoring encouraged and how to monitor for skin cancer (changing or new moles, new rapidly growing or non-healing lesions) reviewed.     5. SKIN CANCER SCREENING      6. SEBORRHEIC KERATOSIS  Generalized  Stuck on verrucous, tan-brown papules and plaques.    This is a benign finding and requires no treatment.    7. SKIN PAIN      Related Procedures  Prior Authorization for Skin Excision - Cysts   F/u in 2-3yrs for FBSE or if new/changing/concerning lesion occurs  Pt seen by Dr. Apolonia Blanchard, PGY-2    I saw and evaluated the patient. I personally obtained the key and critical  portions of the history and physical exam or was physically present for key and critical portions performed by the student/resident. I reviewed the student/resident's documentation and discussed the patient with the student/resident. I was present for the entirety of any procedure(s). I agree with the student/resident's medical decision making as documented in the note.

## 2025-06-23 ENCOUNTER — APPOINTMENT (OUTPATIENT)
Dept: DERMATOLOGY | Facility: CLINIC | Age: 50
End: 2025-06-23
Payer: COMMERCIAL

## 2025-07-14 ENCOUNTER — APPOINTMENT (OUTPATIENT)
Dept: DERMATOLOGY | Facility: CLINIC | Age: 50
End: 2025-07-14
Payer: COMMERCIAL

## 2025-07-14 DIAGNOSIS — D48.5 NEOPLASM OF UNCERTAIN BEHAVIOR OF SKIN: Primary | ICD-10-CM

## 2025-07-14 DIAGNOSIS — L72.11 PILAR CYST: ICD-10-CM

## 2025-07-14 DIAGNOSIS — R20.8 SKIN PAIN: ICD-10-CM

## 2025-07-14 PROCEDURE — 12031 INTMD RPR S/A/T/EXT 2.5 CM/<: CPT | Performed by: DERMATOLOGY

## 2025-07-14 PROCEDURE — 11422 EXC H-F-NK-SP B9+MARG 1.1-2: CPT | Performed by: DERMATOLOGY

## 2025-07-14 NOTE — PROGRESS NOTES
Excision Operative Note    Date of Surgery:  7/14/2025  Surgeon:  Sana Amin MD  Office Location: [address] [address]  Dept: [phone]  Dept Fax: [phone]  Referring Provider: Sana Amin MD  [address] [place] [address] B, [address],  [facility]    [facility]   Tiburcio Peterson Jr. is a 50 y.o. male who presents for the following: Excision (Excision of Pilar Cyst located on Left Occipital Scalp. ) for neoplasm of uncertain behavior of skin.    According to the patient, the lesion has been present for approximately 6 months at the time of diagnosis.  The lesion is not causing symptoms.  The lesion has not been treated previously.    The patient does not have a pacemaker / defibrillator.  The patient does not have a heart valve / joint replacement.    The patient is not on blood thinners.   The patient does not have a history of hepatitis B or C.  The patient does not have a history of HIV.  The patient does not have a history of immunosuppression (e.g. organ transplantation, malignancy, medications)      The following portions of the chart were reviewed this encounter and updated as appropriate:         Assessment/Plan   Pre-procedure:   Obtained informed consent: written from patient  The surgical site was identified and confirmed with the patient.     Intra-operative:   Audible time out called at : 12:46 PM 07/14/25  by: Sana Amin MD   Verified patient name, birthdate, site, specimen bottle label & requisition.    The planned procedure(s) was again reviewed with the patient. The risks of bleeding, infection, nerve damage and scarring were reviewed. The patient identity, surgical site, and planned procedure(s) were verified.       NEOPLASM OF UNCERTAIN BEHAVIOR OF SKIN  Scalp  Skin excision    Lesion length (cm):  1.5  Total excision diameter (cm):  1.5  Informed consent: discussed and consent obtained     Timeout: patient name, date of birth, surgical site, and procedure verified    Procedure prep:  Patient prepped in sterile fashion  Anesthesia: the lesion was anesthetized in a standard fashion    Anesthetic:  1% lidocaine w/ epinephrine 1-100,000 local infiltration  Instrument used: #15 blade    Hemostasis achieved with: electrodesiccation    Outcome: patient tolerated procedure well with no complications    Post-procedure details: sterile dressing applied and wound care instructions given    Dressing type: pressure dressing    Additional details:  The possible diagnoses were explained. Although the lesion is likely benign, the patient requests removal of the lesion because of the symptoms it is causing. Excision was discussed with the patient. The risks, benefits and potential adverse effects were reviewed. Discussion included but was not limited to the cure rate, relative cost, wound care requirements, activity restrictions, likely scar outcome and time to heal were reviewed. Alternative options including monitoring the lesion were discussed. The patient elected to proceed with excision.     Skin repair  Complexity:  Intermediate  Final length (cm):  1.5  Informed consent: discussed and consent obtained    Timeout: patient name, date of birth, surgical site, and procedure verified    Procedure prep:  Patient prepped in sterile fashion  Anesthesia: the lesion was anesthetized in a standard fashion    Reason for type of repair: reduce tension to allow closure    Subcutaneous layers (deep stitches):   Suture size:  5-0  Suture type: Vicryl (polyglactin 910)    Stitches:  Buried vertical mattress  Fine/surface layer approximation (top stitches):   Suture size:  5-0  Suture type: fast-absorbing plain gut    Stitches: running subcuticular    Hemostasis achieved with: electrodesiccation  Outcome: patient tolerated procedure well with no complications    Post-procedure details: sterile dressing applied and wound care  instructions given    Dressing type: pressure dressing      Staff Communication: Dermatology Local Anesthesia: 1 % Lidocaine / Epinephrine - Amount: 5cc  Specimen 1 - Dermatopathology- DERM LAB  Differential Diagnosis: Pilar Cyst  Check Margins Yes/No?:  Yes     Dermpath Lab: Routine Histopathology (formalin-fixed tissue)  PILAR CYST      Related Procedures  Prior Authorization for Skin Excision - Cysts  SKIN PAIN      Related Procedures  Prior Authorization for Skin Excision - Cysts  Intermediate Linear Repair:  Given the location and size of the defect, it was determined that an intermediate layered linear closure was required to restore normal anatomy and function. The repair is an intermediate closure as two layers of sutures were required. The defect was undermined extensively at the level of the subcutaneous plane. Standing cutaneous cones were removed using Burow's triangles. The wound edges were brought into close approximation with buried vertical mattress sutures. The remainder of the wound was then closed with epidermal top sutures.    The final repair measured 1.5 cm    Wound care was discussed, and the patient was given written post-operative wound care instructions.      The patient will follow up with Sana Amin MD as needed for any post operative problems or concerns, and will follow up with their primary dermatologist as scheduled.       Marisa MONDRAGON am scribing for, and in the presence of Sana Amin MD

## 2025-07-16 LAB
LABORATORY COMMENT REPORT: NORMAL
PATH REPORT.FINAL DX SPEC: NORMAL
PATH REPORT.GROSS SPEC: NORMAL
PATH REPORT.MICROSCOPIC SPEC OTHER STN: NORMAL
PATH REPORT.RELEVANT HX SPEC: NORMAL
PATH REPORT.TOTAL CANCER: NORMAL

## 2025-08-07 DIAGNOSIS — E78.2 MIXED HYPERLIPIDEMIA: ICD-10-CM

## 2025-11-05 ENCOUNTER — APPOINTMENT (OUTPATIENT)
Dept: PRIMARY CARE | Facility: CLINIC | Age: 50
End: 2025-11-05
Payer: COMMERCIAL

## 2026-04-16 ENCOUNTER — APPOINTMENT (OUTPATIENT)
Dept: CARDIOLOGY | Facility: CLINIC | Age: 51
End: 2026-04-16
Payer: COMMERCIAL

## 2026-05-27 ENCOUNTER — APPOINTMENT (OUTPATIENT)
Dept: DERMATOLOGY | Facility: CLINIC | Age: 51
End: 2026-05-27
Payer: COMMERCIAL